# Patient Record
Sex: FEMALE | Race: WHITE | URBAN - METROPOLITAN AREA
[De-identification: names, ages, dates, MRNs, and addresses within clinical notes are randomized per-mention and may not be internally consistent; named-entity substitution may affect disease eponyms.]

---

## 2017-01-23 PROBLEM — M79.605 PAIN OF LEFT LOWER EXTREMITY: Status: ACTIVE | Noted: 2017-01-23

## 2017-04-05 PROBLEM — M17.0 PRIMARY OSTEOARTHRITIS OF BOTH KNEES: Status: ACTIVE | Noted: 2017-04-05

## 2021-06-10 DIAGNOSIS — M19.012 PRIMARY OSTEOARTHRITIS OF BOTH SHOULDERS: ICD-10-CM

## 2021-06-10 DIAGNOSIS — Z51.81 ENCOUNTER FOR MONITORING OPIOID MAINTENANCE THERAPY: ICD-10-CM

## 2021-06-10 DIAGNOSIS — F11.90 CHRONIC, CONTINUOUS USE OF OPIOIDS: ICD-10-CM

## 2021-06-10 DIAGNOSIS — M25.512 CHRONIC PAIN OF BOTH SHOULDERS: ICD-10-CM

## 2021-06-10 DIAGNOSIS — M47.817 LUMBOSACRAL SPONDYLOSIS WITHOUT MYELOPATHY: ICD-10-CM

## 2021-06-10 DIAGNOSIS — G89.29 CHRONIC PAIN OF BOTH SHOULDERS: ICD-10-CM

## 2021-06-10 DIAGNOSIS — Z79.891 ENCOUNTER FOR MONITORING OPIOID MAINTENANCE THERAPY: ICD-10-CM

## 2021-06-10 DIAGNOSIS — M17.0 PRIMARY OSTEOARTHRITIS OF BOTH KNEES: ICD-10-CM

## 2021-06-10 DIAGNOSIS — M19.011 PRIMARY OSTEOARTHRITIS OF BOTH SHOULDERS: ICD-10-CM

## 2021-06-10 DIAGNOSIS — M25.511 CHRONIC PAIN OF BOTH SHOULDERS: ICD-10-CM

## 2021-06-10 NOTE — TELEPHONE ENCOUNTER
Requested Prescriptions     Pending Prescriptions Disp Refills    HYDROcodone-acetaminophen (NORCO) 7.5-325 MG per tablet 39 tablet 0     Sig: Take 1 tablet by mouth every 8 hours as needed for Pain for up to 13 days.  gabapentin (NEURONTIN) 600 MG tablet 48 tablet 0     Sig: Take 1 tablet by mouth 3 times daily for 16 days.  gabapentin (NEURONTIN) 300 MG capsule 48 capsule 0     Sig: Take 1 capsule by mouth 3 times daily for 16 days.  600mg plus 300mg for total 900mg TID       PATIENT WAS RESCHEDULED PER DR OLMOS TO [place]

## 2021-06-11 RX ORDER — GABAPENTIN 600 MG/1
600 TABLET ORAL 3 TIMES DAILY
Qty: 15 TABLET | Refills: 0 | Status: SHIPPED | OUTPATIENT
Start: 2021-06-11 | End: 2021-06-15 | Stop reason: SDUPTHER

## 2021-06-11 RX ORDER — HYDROCODONE BITARTRATE AND ACETAMINOPHEN 7.5; 325 MG/1; MG/1
1 TABLET ORAL EVERY 8 HOURS PRN
Qty: 15 TABLET | Refills: 0 | Status: SHIPPED | OUTPATIENT
Start: 2021-06-11 | End: 2021-06-15 | Stop reason: SDUPTHER

## 2021-06-11 RX ORDER — GABAPENTIN 300 MG/1
300 CAPSULE ORAL 3 TIMES DAILY
Qty: 15 CAPSULE | Refills: 0 | Status: SHIPPED | OUTPATIENT
Start: 2021-06-11 | End: 2021-06-15 | Stop reason: SDUPTHER

## 2021-06-15 ENCOUNTER — VIRTUAL VISIT (OUTPATIENT)
Dept: NEUROSURGERY | Age: 81
End: 2021-06-15
Payer: MEDICARE

## 2021-06-15 DIAGNOSIS — Z51.81 ENCOUNTER FOR MONITORING OPIOID MAINTENANCE THERAPY: ICD-10-CM

## 2021-06-15 DIAGNOSIS — M25.511 CHRONIC PAIN OF BOTH SHOULDERS: ICD-10-CM

## 2021-06-15 DIAGNOSIS — M25.512 CHRONIC PAIN OF BOTH SHOULDERS: ICD-10-CM

## 2021-06-15 DIAGNOSIS — M19.012 PRIMARY OSTEOARTHRITIS OF BOTH SHOULDERS: ICD-10-CM

## 2021-06-15 DIAGNOSIS — Z79.891 ENCOUNTER FOR MONITORING OPIOID MAINTENANCE THERAPY: ICD-10-CM

## 2021-06-15 DIAGNOSIS — F11.90 CHRONIC, CONTINUOUS USE OF OPIOIDS: ICD-10-CM

## 2021-06-15 DIAGNOSIS — G89.29 CHRONIC PAIN OF BOTH SHOULDERS: ICD-10-CM

## 2021-06-15 DIAGNOSIS — M17.0 PRIMARY OSTEOARTHRITIS OF BOTH KNEES: ICD-10-CM

## 2021-06-15 DIAGNOSIS — M47.817 LUMBOSACRAL SPONDYLOSIS WITHOUT MYELOPATHY: ICD-10-CM

## 2021-06-15 DIAGNOSIS — M19.011 PRIMARY OSTEOARTHRITIS OF BOTH SHOULDERS: ICD-10-CM

## 2021-06-15 PROCEDURE — 99441 PR PHYS/QHP TELEPHONE EVALUATION 5-10 MIN: CPT | Performed by: NURSE PRACTITIONER

## 2021-06-15 RX ORDER — HYDROCODONE BITARTRATE AND ACETAMINOPHEN 7.5; 325 MG/1; MG/1
1 TABLET ORAL EVERY 8 HOURS PRN
Qty: 90 TABLET | Refills: 0 | Status: SHIPPED | OUTPATIENT
Start: 2021-06-15 | End: 2021-07-13 | Stop reason: SDUPTHER

## 2021-06-15 RX ORDER — GABAPENTIN 600 MG/1
600 TABLET ORAL 3 TIMES DAILY
Qty: 90 TABLET | Refills: 0 | Status: SHIPPED | OUTPATIENT
Start: 2021-06-15 | End: 2021-07-13 | Stop reason: SDUPTHER

## 2021-06-15 RX ORDER — GABAPENTIN 300 MG/1
300 CAPSULE ORAL 3 TIMES DAILY
Qty: 90 CAPSULE | Refills: 0 | Status: SHIPPED | OUTPATIENT
Start: 2021-06-15 | End: 2021-07-13 | Stop reason: SDUPTHER

## 2021-06-15 ASSESSMENT — ENCOUNTER SYMPTOMS
RESPIRATORY NEGATIVE: 1
ABDOMINAL PAIN: 0
BACK PAIN: 1
COLOR CHANGE: 0

## 2021-06-15 NOTE — PROGRESS NOTES
Bianca Diez  (1940)    6/15/2021    TELEHEALTH EVALUATION -- Audio Only (During [facility] public health emergency)    Due to [place] 19 outbreak, patient's office visit was converted to a virtual visit. Patient was contacted and agreed to proceed with a audio only telehealth service. Patient understands that this encounter is a billable visit and that insurance coverage and co-pays are up to their individual insurance plans. At the beginning of this telehealth encounter, I verified with the patient their name and date of birth. Verbal consent for telehealth encounter was obtained. Subjective:       Chief Complaint   Patient presents with    Knee Pain       Bianca Diez is 68 y.o. female who complains today of: Allergies:  Tetanus antitoxin    History:  Past Medical History:   Diagnosis Date    Hypertension     Measles     Mumps      No past surgical history on file. Family History   Problem Relation Age of Onset    Cancer Mother     Cancer Father      Social History     Socioeconomic History    Marital status: Unknown     Spouse name: Not on file    Number of children: Not on file    Years of education: Not on file    Highest education level: Not on file   Occupational History    Not on file   Tobacco Use    Smoking status: Never Smoker    Smokeless tobacco: Never Used   Substance and Sexual Activity    Alcohol use: No     Alcohol/week: 0.0 standard drinks    Drug use: No    Sexual activity: Not on file   Other Topics Concern    Not on file   Social History Narrative    Not on file     Social Determinants of Health     Financial Resource Strain:     Difficulty of Paying Living Expenses:    Food Insecurity:     Worried About Running Out of Food in the Last Year:     [address] in the Last Year:    Transportation Needs:     Lack of Transportation (Medical):      Lack of Transportation (Non-Medical):    Physical Activity:     Days of Exercise per Week:     Minutes of f/u done today with a telehealth visit for her pain d/t Covid 19 pandemic. Follows for  chronic right shoulder, knee pain and low back pain. Pain 7-8/10 to knees. Shoulder and low back are OK. XR of shoulder, low back and knees ordered not done. She received her first Covid vaccine and due for her second next week then hopes she will feel safer going out. Home PT for knees and shoulders completed. Says she is walking more but can't get out because of a step she can not take to get into the car. She refuses further injections for knees and shoulders. PCP switched  Effexor to Cymbalta 30mg about 3 months ago. Pt has been counseling and they may increase to change due to worsening depression. She denies suicidal. . She says she needs to lose weight and will consider knee replacement and is working on losing 50 pounds to be able to have surgery at [place] - she says this process is going slow for her. She saw nutritionist she reports which has helped  She says it is hard to walk d/t her knee pain and exercise. Tries to walk and exercise. She says her daughters will walk behind her with the w/c.        Hx of gastric bypass. Mobic discontinued due to age risk and gastric bypass. Gill Flores in EPIC show significant degenerative changes of Rt knee and hip as well as low back and lesser extent on Lt knee. She has Hx of blood clot in her left leg she reports in the past.   She denies blood thinners. She was just started on cymbalta 30mg daily from her PCP. She continues to use her Norco 7.5mg TID and gabapentin 300mg and 600mg - takes 900mg TID. Pt feels that standing, exercise, walking makes the pain worse, and medication, heating pad,  Staying off feet makes the pain better. Pt feels her medication helps her function and improve her quality of life, specifically says allows the pain to be \"bearable\" when get up to exercise and move. Pt denies radiating numbness and tingling.   Denies recent falls, injuries or trauma. Pt denies new weakness. Pt reports PT has been doing at home.           Knee Pain   Pertinent negatives include no numbness. Review of Systems   Constitutional: Negative. Negative for activity change and unexpected weight change. HENT: Negative. Respiratory: Negative. Gastrointestinal: Negative for abdominal pain. Genitourinary: Negative. Musculoskeletal: Positive for back pain. Negative for gait problem, joint swelling, neck pain and neck stiffness. Knee   Skin: Negative for color change and rash. Neurological: Negative. Negative for dizziness, seizures, weakness, light-headedness, numbness and headaches. Psychiatric/Behavioral: Negative. Negative for sleep disturbance. Objective:     Vitals:  LMP  (LMP Unknown)      PHYSICAL EXAMINATION:    [x] Alert  [x] Oriented to person/place/time  [x] No apparent distress      [x] Mood and affect normal  [x] Recent and remote memory intact  [x] Judgement and insight normal     [] OTHER:      Due to this being a TeleHealth encounter, evaluation of the following organ systems is limited: Vitals/Constitutional/EENT/Resp/CV/GI//MS/Neuro/Skin/Heme-Lymph-Imm. Assessment:      Diagnosis Orders   1. Lumbosacral spondylosis without myelopathy  HYDROcodone-acetaminophen (NORCO) 7.5-325 MG per tablet    gabapentin (NEURONTIN) 600 MG tablet    gabapentin (NEURONTIN) 300 MG capsule   2. Primary osteoarthritis of both knees  HYDROcodone-acetaminophen (NORCO) 7.5-325 MG per tablet   3. Primary osteoarthritis of both shoulders  HYDROcodone-acetaminophen (NORCO) 7.5-325 MG per tablet   4. Chronic, continuous use of opioids  HYDROcodone-acetaminophen (NORCO) 7.5-325 MG per tablet   5. Encounter for monitoring opioid maintenance therapy  HYDROcodone-acetaminophen (NORCO) 7.5-325 MG per tablet   6.  Chronic pain of both shoulders  HYDROcodone-acetaminophen (NORCO) 7.5-325 MG per tablet       Plan:     Periodic Controlled Substance Monitoring: Possible medication side effects, risk of tolerance/dependence & alternative treatments discussed. Deejay Gao, APRN - CNP)    Orders Placed This Encounter   Medications    HYDROcodone-acetaminophen (NORCO) 7.5-325 MG per tablet     Sig: Take 1 tablet by mouth every 8 hours as needed for Pain for up to 30 days. Dispense:  90 tablet     Refill:  0     Reduce doses taken as pain becomes manageable    gabapentin (NEURONTIN) 600 MG tablet     Sig: Take 1 tablet by mouth 3 times daily for 30 days. Dispense:  90 tablet     Refill:  0    gabapentin (NEURONTIN) 300 MG capsule     Sig: Take 1 capsule by mouth 3 times daily for 30 days. 600mg plus 300mg for total 900mg TID     Dispense:  90 capsule     Refill:  0       No orders of the defined types were placed in this encounter.    -she is unable to get into office due to her pain. She is going to try in the next couple of months.    -A 5-day refill was sent however she states she never picked it up at the pharmacy. She took her last pill today  -Refill Norco 7.5 mg 3 times daily, #90, fill today  -Refill gabapentin 600 mg 3 times daily, #90, fill today  -Refill gabapentin 300 mg 3 times daily, #90, fill today    No orders of the defined types were placed in this encounter.     Discussed options with the patient today. Anatomic model pathology was shown and reviewed with pt. All questions were answered. Relevant imaging reviewed, NDP reviewed and pain generators reviewed. Pt verbalized understanding and agrees with above plan. Will continue medications as they do help pt function with ADL and improve quality of life. Pt understands potential side effects from opioids such as constipation, dry mouth, dizziness, opioid use disorder, and overdose. Pt has failed non opioid therapy and decision was made to prescribe opioids to help with daily function and improve quality of life.     OARRS was reviewed.    UTOX from 9/23/2019 appropriate; ORT score = 2     This NP saw pt under direct supervision of Dr Teodora Abrams    Follow up:  Return in about 4 weeks (around 7/13/2021) for review meds and reassess pain. ESTEFANY Santos CNP      >50% of 7 minute appointment was spent with discussion, addressing questions and concerns, including prognosis, treatment options, patient education, and recommendations. [id number]    Pursuant to the emergency declaration under the [address] [place] [address], [facility] waiver authority and the [employer] Act, this Virtual  Visit was conducted, with patient's consent, to reduce the patient's risk of exposure to COVID-19 and provide continuity of care for an established patient. Services were provided through an audio discussion to substitute for in-person clinic visit.

## 2021-06-16 ENCOUNTER — TELEPHONE (OUTPATIENT)
Dept: PAIN MANAGEMENT | Age: 81
End: 2021-06-16

## 2021-06-16 NOTE — TELEPHONE ENCOUNTER
Patient called stating that she bought a lift chair. She is asking if Dr. Kimberly Wong can write a prescription for her for this so that she does not have to pay the tax on it. She is asking if it can be faxed ti [phone]. [employer] as soon as possible.

## 2021-07-13 ENCOUNTER — VIRTUAL VISIT (OUTPATIENT)
Dept: NEUROSURGERY | Age: 81
End: 2021-07-13
Payer: MEDICARE

## 2021-07-13 DIAGNOSIS — M19.011 PRIMARY OSTEOARTHRITIS OF BOTH SHOULDERS: ICD-10-CM

## 2021-07-13 DIAGNOSIS — Z79.891 ENCOUNTER FOR MONITORING OPIOID MAINTENANCE THERAPY: ICD-10-CM

## 2021-07-13 DIAGNOSIS — G89.29 CHRONIC PAIN OF BOTH SHOULDERS: ICD-10-CM

## 2021-07-13 DIAGNOSIS — Z51.81 ENCOUNTER FOR MONITORING OPIOID MAINTENANCE THERAPY: ICD-10-CM

## 2021-07-13 DIAGNOSIS — F11.90 CHRONIC, CONTINUOUS USE OF OPIOIDS: ICD-10-CM

## 2021-07-13 DIAGNOSIS — M17.0 PRIMARY OSTEOARTHRITIS OF BOTH KNEES: ICD-10-CM

## 2021-07-13 DIAGNOSIS — M19.012 PRIMARY OSTEOARTHRITIS OF BOTH SHOULDERS: ICD-10-CM

## 2021-07-13 DIAGNOSIS — M47.817 LUMBOSACRAL SPONDYLOSIS WITHOUT MYELOPATHY: ICD-10-CM

## 2021-07-13 DIAGNOSIS — M25.511 CHRONIC PAIN OF BOTH SHOULDERS: ICD-10-CM

## 2021-07-13 DIAGNOSIS — M25.512 CHRONIC PAIN OF BOTH SHOULDERS: ICD-10-CM

## 2021-07-13 PROCEDURE — 99441 PR PHYS/QHP TELEPHONE EVALUATION 5-10 MIN: CPT | Performed by: NURSE PRACTITIONER

## 2021-07-13 RX ORDER — HYDROCODONE BITARTRATE AND ACETAMINOPHEN 7.5; 325 MG/1; MG/1
1 TABLET ORAL EVERY 8 HOURS PRN
Qty: 90 TABLET | Refills: 0 | Status: SHIPPED | OUTPATIENT
Start: 2021-07-19 | End: 2021-08-10 | Stop reason: SDUPTHER

## 2021-07-13 RX ORDER — GABAPENTIN 600 MG/1
600 TABLET ORAL 3 TIMES DAILY
Qty: 90 TABLET | Refills: 0 | Status: SHIPPED | OUTPATIENT
Start: 2021-07-13 | End: 2021-08-10 | Stop reason: SDUPTHER

## 2021-07-13 RX ORDER — GABAPENTIN 300 MG/1
300 CAPSULE ORAL 3 TIMES DAILY
Qty: 90 CAPSULE | Refills: 0 | Status: SHIPPED | OUTPATIENT
Start: 2021-07-13 | End: 2021-08-10 | Stop reason: SDUPTHER

## 2021-07-13 ASSESSMENT — ENCOUNTER SYMPTOMS
BACK PAIN: 1
COLOR CHANGE: 0
ABDOMINAL PAIN: 0
RESPIRATORY NEGATIVE: 1

## 2021-07-13 NOTE — PROGRESS NOTES
Minutes of Exercise per Session:    Stress:     Feeling of Stress :    Social Connections:     Frequency of Communication with Friends and Family:     Frequency of Social Gatherings with Friends and Family:     Attends [employer]ism Services:     Active Member of Clubs or Organizations:     Attends Club or Organization Meetings:     Marital Status:    Intimate Partner Violence:     Fear of Current or Ex-Partner:     Emotionally Abused:     Physically Abused:     Sexually Abused:        Current Outpatient Medications on File Prior to Visit   Medication Sig Dispense Refill    naloxone 4 MG/0.1ML LIQD nasal spray 1 spray by Nasal route as needed for Opioid Reversal 1 each 0    lidocaine (LMX) 4 % cream Apply a half dollar sized amount to intact skin topically up to twice daily as needed for pain 1 Tube 1    meloxicam (MOBIC) 7.5 MG tablet Take 1 tablet by mouth daily 90 tablet 3    PRISTIQ 50 MG TB24 extended release tablet       MYRBETRIQ 25 MG TB24       pravastatin (PRAVACHOL) 10 MG tablet TK 1 T PO  QHS  1    escitalopram (LEXAPRO) 10 MG tablet   0    desvenlafaxine succinate (PRISTIQ) 100 MG TB24 Take 100 mg by mouth      enalapril (VASOTEC) 10 MG tablet Take by mouth      enalapril-hydrochlorothiazide (VASERETIC) 10-25 MG per tablet   0    oxybutynin (DITROPAN) 5 MG tablet   0     Current Facility-Administered Medications on File Prior to Visit   Medication Dose Route Frequency Provider Last Rate Last Admin    betamethasone acetate-betamethasone sodium phosphate (CELESTONE) injection 6 mg  6 mg Intra-articular Once Krissy Jarvis MD        betamethasone acetate-betamethasone sodium phosphate (CELESTONE) injection 6 mg  6 mg Intra-articular Once Krissy MD Matheus        betamethasone acetate-betamethasone sodium phosphate (CELESTONE) injection 6 mg  6 mg Intra-articular Once Krissy Jarvis MD           She  tried physical therapy.    Date of lastof last PT treatment:     Pt's f/u done today with a telehealth visit for her pain d/t Covid 19 pandemic. Follows for  chronic right shoulder, knee pain and low back pain. Pain 8-9/10 to knees. Shoulder and low back are OK. XR of shoulder, low back and knees ordered not done. Home PT for knees and shoulders completed. Says she is walking more but can't get out because of a step she can not take to get into the car. She refuses further injections for knees and shoulders. PCP switched  Effexor to Cymbalta 30mg. Pt has been counseling and they may increase to change due to worsening depression. She denies suicidal. . She says she needs to lose weight and will consider knee replacement and is working on losing 50 pounds to be able to have surgery at [facility] - she says this process is going slow for her. She saw nutritionist she reports which has helped  She says it is hard to walk d/t her knee pain and exercise. Tries to walk and exercise. She says her daughters will walk behind her with the w/c.        Hx of gastric bypass. Mobic discontinued due to age risk and gastric bypass. XR's in EPIC show significant degenerative changes of Rt knee and hip as well as low back and lesser extent on Lt knee. She has Hx of blood clot in her left leg she reports in the past.   She denies blood thinners. She was just started on cymbalta 30mg daily from her PCP. She continues to use her Norco 7.5mg TID and gabapentin 300mg and 600mg - takes 900mg TID. Pt feels that standing, exercise, walking makes the pain worse, and medication, heating pad,  Staying off feet makes the pain better. Pt feels her medication helps her function and improve her quality of life, specifically says allows the pain to be \"bearable\" when get up to exercise and move. Pt denies radiating numbness and tingling. Denies recent falls, injuries or trauma. Pt denies new weakness. Pt reports PT has been doing at home.              Knee Pain   Pertinent negatives include no numbness. Review of Systems   Constitutional: Negative. Negative for activity change and unexpected weight change. HENT: Negative. Respiratory: Negative. Gastrointestinal: Negative for abdominal pain. Genitourinary: Negative. Musculoskeletal: Positive for back pain. Negative for gait problem, joint swelling, neck pain and neck stiffness. Knees   Skin: Negative for color change and rash. Neurological: Negative. Negative for dizziness, seizures, weakness, light-headedness, numbness and headaches. Psychiatric/Behavioral: Negative. Negative for sleep disturbance. Objective:     Vitals:  LMP  (LMP Unknown)      PHYSICAL EXAMINATION:    [x] Alert  [x] Oriented to person/place/time  [x] No apparent distress      [x] Mood and affect normal  [x] Recent and remote memory intact  [x] Judgement and insight normal     [] OTHER:      Due to this being a TeleHealth encounter, evaluation of the following organ systems is limited: Vitals/Constitutional/EENT/Resp/CV/GI//MS/Neuro/Skin/Heme-Lymph-Imm. Assessment:      Diagnosis Orders   1. Lumbosacral spondylosis without myelopathy  HYDROcodone-acetaminophen (NORCO) 7.5-325 MG per tablet    gabapentin (NEURONTIN) 600 MG tablet    gabapentin (NEURONTIN) 300 MG capsule   2. Primary osteoarthritis of both knees  HYDROcodone-acetaminophen (NORCO) 7.5-325 MG per tablet   3. Primary osteoarthritis of both shoulders  HYDROcodone-acetaminophen (NORCO) 7.5-325 MG per tablet   4. Chronic, continuous use of opioids  HYDROcodone-acetaminophen (NORCO) 7.5-325 MG per tablet   5. Encounter for monitoring opioid maintenance therapy  HYDROcodone-acetaminophen (NORCO) 7.5-325 MG per tablet   6. Chronic pain of both shoulders  HYDROcodone-acetaminophen (NORCO) 7.5-325 MG per tablet       Plan:     Periodic Controlled Substance Monitoring: Possible medication side effects, risk of tolerance/dependence & alternative treatments discussed.  ESTEFANY Ortega - CNP)    Orders Placed This Encounter   Medications    HYDROcodone-acetaminophen (NORCO) 7.5-325 MG per tablet     Sig: Take 1 tablet by mouth every 8 hours as needed for Pain for up to 30 days. Dispense:  90 tablet     Refill:  0     Reduce doses taken as pain becomes manageable    gabapentin (NEURONTIN) 600 MG tablet     Sig: Take 1 tablet by mouth 3 times daily for 30 days. Dispense:  90 tablet     Refill:  0    gabapentin (NEURONTIN) 300 MG capsule     Sig: Take 1 capsule by mouth 3 times daily for 30 days. 600mg plus 300mg for total 900mg TID     Dispense:  90 capsule     Refill:  0       No orders of the defined types were placed in this encounter.    -she is unable to get into office due to her pain. She is going to try in the next couple of months.   -Refill Norco 7.5 mg 3 times daily, #90, fill date 7/19/21  -Refill gabapentin 600 mg 3 times daily, #90, fill date 7/19/21  -Refill gabapentin 300 mg 3 times daily, #90, fill date 7/19/21      No orders of the defined types were placed in this encounter.     Discussed options with the patient today. Anatomic model pathology was shown and reviewed with pt.  All questions were answered.  Relevant imaging reviewed, NDP reviewed and pain generators reviewed. Pt verbalized understanding and agrees with above plan. Will continue medications as they do help pt function with ADL and improve quality of life.  Pt understands potential side effects from opioids such as constipation, dry mouth, dizziness, opioid use disorder, and overdose. Pt has failed non opioid therapy and decision was made to prescribe opioids to help with daily function and improve quality of life.     OARRS was reviewed. UTOX from 9/23/2019 appropriate; ORT score = 2     This NP saw pt under direct supervision of Dr Mayer       Follow up:  Return in about 4 weeks (around 8/10/2021) for review meds and reassess pain.     ESTEFANY Molina - CNP      >50% of 10 minute appointment was spent with discussion, addressing questions and concerns, including prognosis, treatment options, patient education, and recommendations. [id number]    Pursuant to the emergency declaration under the [facility], [facility] waiver authority and the [employer] Act, this Virtual  Visit was conducted, with patient's consent, to reduce the patient's risk of exposure to COVID-19 and provide continuity of care for an established patient. Services were provided through an audio discussion to substitute for in-person clinic visit.

## 2021-08-10 ENCOUNTER — VIRTUAL VISIT (OUTPATIENT)
Dept: NEUROSURGERY | Age: 81
End: 2021-08-10
Payer: MEDICARE

## 2021-08-10 DIAGNOSIS — F11.90 CHRONIC, CONTINUOUS USE OF OPIOIDS: ICD-10-CM

## 2021-08-10 DIAGNOSIS — M47.817 LUMBOSACRAL SPONDYLOSIS WITHOUT MYELOPATHY: ICD-10-CM

## 2021-08-10 DIAGNOSIS — M17.0 PRIMARY OSTEOARTHRITIS OF BOTH KNEES: ICD-10-CM

## 2021-08-10 DIAGNOSIS — Z79.891 ENCOUNTER FOR MONITORING OPIOID MAINTENANCE THERAPY: ICD-10-CM

## 2021-08-10 DIAGNOSIS — M19.012 PRIMARY OSTEOARTHRITIS OF BOTH SHOULDERS: ICD-10-CM

## 2021-08-10 DIAGNOSIS — M19.011 PRIMARY OSTEOARTHRITIS OF BOTH SHOULDERS: ICD-10-CM

## 2021-08-10 DIAGNOSIS — M25.512 CHRONIC PAIN OF BOTH SHOULDERS: ICD-10-CM

## 2021-08-10 DIAGNOSIS — G89.29 CHRONIC PAIN OF BOTH SHOULDERS: ICD-10-CM

## 2021-08-10 DIAGNOSIS — Z51.81 ENCOUNTER FOR MONITORING OPIOID MAINTENANCE THERAPY: ICD-10-CM

## 2021-08-10 DIAGNOSIS — M25.511 CHRONIC PAIN OF BOTH SHOULDERS: ICD-10-CM

## 2021-08-10 PROCEDURE — 99441 PR PHYS/QHP TELEPHONE EVALUATION 5-10 MIN: CPT | Performed by: NURSE PRACTITIONER

## 2021-08-10 RX ORDER — HYDROCODONE BITARTRATE AND ACETAMINOPHEN 7.5; 325 MG/1; MG/1
1 TABLET ORAL EVERY 8 HOURS PRN
Qty: 90 TABLET | Refills: 0 | Status: SHIPPED | OUTPATIENT
Start: 2021-08-18 | End: 2021-09-17

## 2021-08-10 RX ORDER — GABAPENTIN 600 MG/1
600 TABLET ORAL 3 TIMES DAILY
Qty: 90 TABLET | Refills: 0 | Status: SHIPPED | OUTPATIENT
Start: 2021-08-10 | End: 2021-09-09

## 2021-08-10 RX ORDER — GABAPENTIN 300 MG/1
300 CAPSULE ORAL 3 TIMES DAILY
Qty: 90 CAPSULE | Refills: 0 | Status: SHIPPED | OUTPATIENT
Start: 2021-08-10 | End: 2021-09-09

## 2021-08-10 ASSESSMENT — ENCOUNTER SYMPTOMS
RESPIRATORY NEGATIVE: 1
COLOR CHANGE: 0
ABDOMINAL PAIN: 0
BACK PAIN: 1

## 2021-08-10 NOTE — PROGRESS NOTES
Ac Benitez  (1940)    8/10/2021    TELEHEALTH EVALUATION -- Audio Only (During BMAMD-84 public health emergency)    Due to [place] 19 outbreak, patient's office visit was converted to a virtual visit. Patient was contacted and agreed to proceed with a audio only telehealth service. Patient understands that this encounter is a billable visit and that insurance coverage and co-pays are up to their individual insurance plans. At the beginning of this telehealth encounter, I verified with the patient their name and date of birth. Verbal consent for telehealth encounter was obtained. Subjective:       Chief Complaint   Patient presents with    Knee Pain       Ac Benitez is 80 y.o. female who complains today of: Allergies:  Tetanus antitoxin    History:  Past Medical History:   Diagnosis Date    Hypertension     Measles     Mumps      No past surgical history on file. Family History   Problem Relation Age of Onset    Cancer Mother     Cancer Father      Social History     Socioeconomic History    Marital status: Unknown     Spouse name: Not on file    Number of children: Not on file    Years of education: Not on file    Highest education level: Not on file   Occupational History    Not on file   Tobacco Use    Smoking status: Never Smoker    Smokeless tobacco: Never Used   Substance and Sexual Activity    Alcohol use: No     Alcohol/week: 0.0 standard drinks    Drug use: No    Sexual activity: Not on file   Other Topics Concern    Not on file   Social History Narrative    Not on file     Social Determinants of Health     Financial Resource Strain:     Difficulty of Paying Living Expenses:    Food Insecurity:     Worried About Running Out of Food in the Last Year:     [address] in the Last Year:    Transportation Needs:     Lack of Transportation (Medical):      Lack of Transportation (Non-Medical):    Physical Activity:     Days of Exercise per Week:     Minutes of Exercise per Session:    Stress:     Feeling of Stress :    Social Connections:     Frequency of Communication with Friends and Family:     Frequency of Social Gatherings with Friends and Family:     Attends Evangelical Services:     Active Member of Clubs or Organizations:     Attends Club or Organization Meetings:     Marital Status:    Intimate Partner Violence:     Fear of Current or Ex-Partner:     Emotionally Abused:     Physically Abused:     Sexually Abused:        Current Outpatient Medications on File Prior to Visit   Medication Sig Dispense Refill    naloxone 4 MG/0.1ML LIQD nasal spray 1 spray by Nasal route as needed for Opioid Reversal 1 each 0    PRISTIQ 50 MG TB24 extended release tablet       MYRBETRIQ 25 MG TB24       pravastatin (PRAVACHOL) 10 MG tablet TK 1 T PO  QHS  1    escitalopram (LEXAPRO) 10 MG tablet   0    desvenlafaxine succinate (PRISTIQ) 100 MG TB24 Take 100 mg by mouth      enalapril (VASOTEC) 10 MG tablet Take by mouth      enalapril-hydrochlorothiazide (VASERETIC) 10-25 MG per tablet   0    oxybutynin (DITROPAN) 5 MG tablet   0    lidocaine (LMX) 4 % cream Apply a half dollar sized amount to intact skin topically up to twice daily as needed for pain 1 Tube 1    meloxicam (MOBIC) 7.5 MG tablet Take 1 tablet by mouth daily 90 tablet 3     Current Facility-Administered Medications on File Prior to Visit   Medication Dose Route Frequency Provider Last Rate Last Admin    betamethasone acetate-betamethasone sodium phosphate (CELESTONE) injection 6 mg  6 mg Intra-articular Once [place] MD Maria C        betamethasone acetate-betamethasone sodium phosphate (CELESTONE) injection 6 mg  6 mg Intra-articular Once [place] StageMD karen        betamethasone acetate-betamethasone sodium phosphate (CELESTONE) injection 6 mg  6 mg Intra-articular Once [place] MD Maria C           She  tried physical therapy.    Date of lastof last PT treatment:     Pt's f/u done today with a telehealth visit for her pain d/t Covid 19 pandemic. Follows for  chronic right shoulder, knee pain and low back pain. Pain 8-9/10 to knees. Shoulder and low back are OK. XR of shoulder, low back and knees ordered not done. Home PT for knees and shoulders completed. [address] would do her knee surgery if she loses 50#. Says she is walking more but can't get out because of a step she can not take to get into the car. She refuses further injections for knees and shoulders. PCP switched  Effexor to Cymbalta 30mg. Pt has been counseling and they may increase to change due to worsening depression. She denies suicidal. . She says she needs to lose weight and will consider knee replacement and is working on losing 50 pounds to be able to have surgery at [facility] - she says this process is going slow for her. She saw nutritionist she reports which has helped  She says it is hard to walk d/t her knee pain and exercise. Tries to walk and exercise. She says her daughters will walk behind her with the w/c.        Hx of gastric bypass. Mobic discontinued due to age risk and gastric bypass. XR's in EPIC show significant degenerative changes of Rt knee and hip as well as low back and lesser extent on Lt knee. She has Hx of blood clot in her left leg she reports in the past.   She denies blood thinners. She was just started on cymbalta 30mg daily from her PCP. She continues to use her Norco 7.5mg TID and gabapentin 300mg and 600mg - takes 900mg TID. Pt feels that standing, exercise, walking makes the pain worse, and medication, heating pad,  Staying off feet makes the pain better. Pt feels her medication helps her function and improve her quality of life, specifically says allows the pain to be \"bearable\" when get up to exercise and move. Pt denies radiating numbness and tingling. Denies recent falls, injuries or trauma. Pt denies new weakness.  Pt reports PT has been doing at home.                 Knee Pain   Pertinent negatives include no numbness. Review of Systems   Constitutional: Negative. Negative for activity change and unexpected weight change. HENT: Negative. Respiratory: Negative. Gastrointestinal: Negative for abdominal pain. Genitourinary: Negative. Musculoskeletal: Positive for back pain. Negative for gait problem, joint swelling, neck pain and neck stiffness. Skin: Negative for color change and rash. Neurological: Negative. Negative for dizziness, seizures, weakness, light-headedness, numbness and headaches. Psychiatric/Behavioral: Negative. Negative for sleep disturbance. Objective:     Vitals:  LMP  (LMP Unknown)      PHYSICAL EXAMINATION:    [x] Alert  [x] Oriented to person/place/time  [x] No apparent distress      [x] Mood and affect normal  [x] Recent and remote memory intact  [x] Judgement and insight normal     [] OTHER:      Due to this being a TeleHealth encounter, evaluation of the following organ systems is limited: Vitals/Constitutional/EENT/Resp/CV/GI//MS/Neuro/Skin/Heme-Lymph-Imm. Assessment:      Diagnosis Orders   1. Lumbosacral spondylosis without myelopathy  HYDROcodone-acetaminophen (NORCO) 7.5-325 MG per tablet    gabapentin (NEURONTIN) 600 MG tablet    gabapentin (NEURONTIN) 300 MG capsule   2. Primary osteoarthritis of both knees  HYDROcodone-acetaminophen (NORCO) 7.5-325 MG per tablet   3. Primary osteoarthritis of both shoulders  HYDROcodone-acetaminophen (NORCO) 7.5-325 MG per tablet   4. Chronic, continuous use of opioids  HYDROcodone-acetaminophen (NORCO) 7.5-325 MG per tablet   5. Encounter for monitoring opioid maintenance therapy  HYDROcodone-acetaminophen (NORCO) 7.5-325 MG per tablet   6.  Chronic pain of both shoulders  HYDROcodone-acetaminophen (NORCO) 7.5-325 MG per tablet       Plan:          Orders Placed This Encounter   Medications    HYDROcodone-acetaminophen (NORCO) 7.5-325 MG per tablet     Sig: Take 1 tablet by mouth every 8 hours as needed for Pain for up to 30 days. Dispense:  90 tablet     Refill:  0     Reduce doses taken as pain becomes manageable    gabapentin (NEURONTIN) 600 MG tablet     Sig: Take 1 tablet by mouth 3 times daily for 30 days. Dispense:  90 tablet     Refill:  0    gabapentin (NEURONTIN) 300 MG capsule     Sig: Take 1 capsule by mouth 3 times daily for 30 days. 600mg plus 300mg for total 900mg TID     Dispense:  90 capsule     Refill:  0       No orders of the defined types were placed in this encounter. she is unable to get into office due to her pain. She is going to try in the next couple of months.   -Refill Norco 7.5 mg 3 times daily, #90, fill date 8/18/21  -Refill gabapentin 600 mg 3 times daily, #90, fill date 8/20/21  -Refill gabapentin 300 mg 3 times daily, #90, fill date 8/20/21      No orders of the defined types were placed in this encounter.     Discussed options with the patient today. Anatomic model pathology was shown and reviewed with pt.  All questions were answered.  Relevant imaging reviewed, NDP reviewed and pain generators reviewed. Pt verbalized understanding and agrees with above plan. Will continue medications as they do help pt function with ADL and improve quality of life.  Pt understands potential side effects from opioids such as constipation, dry mouth, dizziness, opioid use disorder, and overdose. Pt has failed non opioid therapy and decision was made to prescribe opioids to help with daily function and improve quality of life.     OARRS was reviewed. UTOX from 9/23/2019 appropriate; ORT score = 2     This NP saw pt under direct supervision of Dr Mayer    Follow up:  Return in about 4 weeks (around 9/7/2021) for review meds and reassess pain.     Rosanne Nazario, APRN - CNP      >50% of 7 minute appointment was spent with discussion, addressing questions and concerns, including prognosis, treatment options, patient education, and recommendations. [id number]    Pursuant to the emergency declaration under the [facility], On license of [facility] waiver authority and the [employer] Act, this Virtual  Visit was conducted, with patient's consent, to reduce the patient's risk of exposure to COVID-19 and provide continuity of care for an established patient. Services were provided through an audio discussion to substitute for in-person clinic visit.

## 2021-09-07 ENCOUNTER — TELEPHONE (OUTPATIENT)
Dept: NEUROSURGERY | Age: 81
End: 2021-09-07

## 2021-09-07 NOTE — TELEPHONE ENCOUNTER
CALLED PT TO DO TELE HEALTH VISIT WITH LORA, PER PT PLEASE CANCEL ALL FUTURE APPTS. PT IS DISCHARGING HERSELF FROM OUR PRACTICE, HAS FOUND ANOTHER PROVIDER THAT WILL COME TO HER HOUSE.  PER PT \"IF IT DOESN'T WORK OUT SHE WILL GIVE US A CALL\"

## 2021-11-17 ENCOUNTER — VIRTUAL VISIT (OUTPATIENT)
Dept: PAIN MANAGEMENT | Age: 81
End: 2021-11-17
Payer: MEDICARE

## 2021-11-17 DIAGNOSIS — M17.0 PRIMARY OSTEOARTHRITIS OF BOTH KNEES: Primary | ICD-10-CM

## 2021-11-17 PROCEDURE — 99441 PR PHYS/QHP TELEPHONE EVALUATION 5-10 MIN: CPT | Performed by: NURSE PRACTITIONER

## 2021-11-17 RX ORDER — HYDROCODONE BITARTRATE AND ACETAMINOPHEN 7.5; 325 MG/1; MG/1
TABLET ORAL
COMMUNITY

## 2021-11-17 ASSESSMENT — ENCOUNTER SYMPTOMS
TROUBLE SWALLOWING: 0
EYES NEGATIVE: 1
BACK PAIN: 0
DIARRHEA: 0
COUGH: 0
SHORTNESS OF BREATH: 0
CONSTIPATION: 0
GASTROINTESTINAL NEGATIVE: 1

## 2021-11-17 NOTE — PROGRESS NOTES
Cezar Gardner  (1940)    11/17/2021    TELEHEALTH EVALUATION -- Audio/Visual (During YMWKG-86 public health emergency)    Due to COVID 19 outbreak, patient's office visit was converted to a virtual visit. Patient was contacted and agreed to proceed with a virtual visit via audio-visual platform. Patient understands that this encounter is a billable visit and that insurance coverage and co-pays are up to their individual insurance plans. At the beginning of this telehealth encounter, I verified with the patient their name and date of birth. Verbal consent for telehealth encounter was obtained. Subjective:       Chief Complaint   Patient presents with    Follow-up     Medication Renewal       Cezar Gardner is 80 y.o. female who complains today of: Allergies:  Tetanus antitoxin    History:  Past Medical History:   Diagnosis Date    Hypertension     Measles     Mumps      History reviewed. No pertinent surgical history. Family History   Problem Relation Age of Onset    Cancer Mother     Cancer Father      Social History     Socioeconomic History    Marital status: Unknown     Spouse name: Not on file    Number of children: Not on file    Years of education: Not on file    Highest education level: Not on file   Occupational History    Not on file   Tobacco Use    Smoking status: Never Smoker    Smokeless tobacco: Never Used   Substance and Sexual Activity    Alcohol use: No     Alcohol/week: 0.0 standard drinks    Drug use: No    Sexual activity: Not on file   Other Topics Concern    Not on file   Social History Narrative    Not on file     Social Determinants of Health     Financial Resource Strain:     Difficulty of Paying Living Expenses: Not on file   Food Insecurity:     Worried About Running Out of Food in the Last Year: Not on file    [place] of Food in the Last Year: Not on file   Transportation Needs:     Lack of Transportation (Medical):  Not on file    Lack of Transportation (Non-Medical): Not on file   Physical Activity:     Days of Exercise per Week: Not on file    Minutes of Exercise per Session: Not on file   Stress:     Feeling of Stress : Not on file   Social Connections:     Frequency of Communication with Friends and Family: Not on file    Frequency of Social Gatherings with Friends and Family: Not on file    Attends Hinduism Services: Not on file    Active Member of [address] [place] [address] or Organizations: Not on file    Attends Club or Organization Meetings: Not on file    Marital Status: Not on file   Intimate Partner Violence:     Fear of Current or Ex-Partner: Not on file    Emotionally Abused: Not on file    Physically Abused: Not on file    Sexually Abused: Not on file   Housing Stability:     Unable to Pay for Housing in the Last Year: Not on file    Number of [place] in the Last Year: Not on file    Unstable Housing in the Last Year: Not on file       Current Outpatient Medications on File Prior to Visit   Medication Sig Dispense Refill    HYDROcodone-acetaminophen (Malcolm Rdz) 7.5-325 MG per tablet       gabapentin (NEURONTIN) 600 MG tablet Take 1 tablet by mouth 3 times daily for 30 days. 90 tablet 0    gabapentin (NEURONTIN) 300 MG capsule Take 1 capsule by mouth 3 times daily for 30 days.  600mg plus 300mg for total 900mg TID 90 capsule 0    naloxone 4 MG/0.1ML LIQD nasal spray 1 spray by Nasal route as needed for Opioid Reversal 1 each 0    lidocaine (LMX) 4 % cream Apply a half dollar sized amount to intact skin topically up to twice daily as needed for pain 1 Tube 1    meloxicam (MOBIC) 7.5 MG tablet Take 1 tablet by mouth daily 90 tablet 3    PRISTIQ 50 MG TB24 extended release tablet       MYRBETRIQ 25 MG TB24       pravastatin (PRAVACHOL) 10 MG tablet TK 1 T PO  QHS  1    escitalopram (LEXAPRO) 10 MG tablet   0    desvenlafaxine succinate (PRISTIQ) 100 MG TB24 Take 100 mg by mouth      enalapril (VASOTEC) 10 MG tablet Take by mouth  enalapril-hydrochlorothiazide (VASERETIC) 10-25 MG per tablet   0    oxybutynin (DITROPAN) 5 MG tablet   0     Current Facility-Administered Medications on File Prior to Visit   Medication Dose Route Frequency Provider Last Rate Last Admin    betamethasone acetate-betamethasone sodium phosphate (CELESTONE) injection 6 mg  6 mg Intra-artICUlar Once  Service, MD        betamethasone acetate-betamethasone sodium phosphate (CELESTONE) injection 6 mg  6 mg Intra-artICUlar Once  Service, MD        betamethasone acetate-betamethasone sodium phosphate (CELESTONE) injection 6 mg  6 mg Intra-artICUlar Once  Service, MD             Pt's f/u done today with a telehealth visit for her pain d/t Covid 19 pandemic. Pt last saw Geovany Holley CNP in August 2021. She is in [place] [address] and says came to stay and says had UTI \"staph infection\" and says \"kidneys shut down\". Spoke with Grace Mejía nurse. She has been taking norco at [facility]. Nurse feels a lot of pain d/t her weight. She needs to be gopal lifted into chair. Doing PT. Nurse reports that she has not been very active. Recently had 90 day Rx for gabapentin 800mg in September from another provider. She has  chronic right shoulder, knee pain and low back pain. Hx of gastric bypass. Mobic discontinued due to age risk and gastric bypass in the past.  XR's in EPIC show significant degenerative changes of Rt knee and hip as well as low back and lesser extent on Lt knee. She has Hx of blood clot in her left leg she reports in the past.   She denies blood thinners. Review of Systems   Constitutional: Negative. Negative for fatigue. HENT: Negative. Negative for trouble swallowing. Eyes: Negative. Respiratory: Negative for cough and shortness of breath. Cardiovascular: Negative for chest pain. Gastrointestinal: Negative. Negative for constipation and diarrhea. Endocrine: Negative.     Genitourinary: Negative. Musculoskeletal: Positive for arthralgias. Negative for back pain and neck pain. Skin: Negative. Neurological: Negative for dizziness, weakness and headaches. Hematological: Negative. Psychiatric/Behavioral: Negative. Objective:     Vitals:  LMP  (LMP Unknown)      PHYSICAL EXAMINATION:    [x] Alert  [x] Oriented to person/place/time  [x] No apparent distress      [x] Mood and affect normal  [x] Recent and remote memory intact  [x] Judgement and insight normal     [] Breathing appears normal  [] No rash, lesions or ulcers on visible skin    [] Cranial Nerves II-XII grossly intact    [] Motor grossly intact in visible upper extremities    [] Motor grossly intact in visible lower extremities    [] Normal gait and station   [] No digital cyanosis    [] OTHER:      Due to this being a TeleHealth encounter, evaluation of the following organ systems is limited: Vitals/Constitutional/EENT/Resp/CV/GI//MS/Neuro/Skin/Heme-Lymph-Imm. Assessment:      Diagnosis Orders   1. Primary osteoarthritis of both knees         Plan:     Periodic Controlled Substance Monitoring: Possible medication side effects, risk of tolerance/dependence & alternative treatments discussed., No signs of potential drug abuse or diversion identified. , Assessed functional status., Obtaining appropriate analgesic effect of treatment. (Rima Varela, APRN - CNP)    No orders of the defined types were placed in this encounter. No orders of the defined types were placed in this encounter. Discussed options with the patient today. Telehealth visit d/t COVID-19 as noted above. Vies patient as well as patient's nurse that patient will need to be seen in the office for evaluation when she is discharged from her rehab facility. [facility] is managing her pain at this time. She has been doing physical therapy evidently and can continue. No medications will be sent today.  She has not received medications from this office since August of this year. Patient verbalized understanding and agreed to above plan and will call office to schedule an in person visit when she is discharged. Had a long conversation with patient that this patient needs to be seen in the office for the exam for physical exam as is been quite sometime since that was done. OARRS was reviewed. This NP saw pt under direct supervision of Dr. Ricky Reeves. Follow up:  Return if symptoms worsen or fail to improve. Rima Martinez, APRN - CNP      >50% of 10 minute appointment was spent with discussion, addressing questions and concerns, including prognosis, treatment options, patient education, and recommendations. [id number]    Pursuant to the emergency declaration under the [facility], 1135 waiver authority and the [employer] Act, this Virtual  Visit was conducted, with patient's consent, to reduce the patient's risk of exposure to COVID-19 and provide continuity of care for an established patient. Services were provided through a video synchronous discussion virtually to substitute for in-person clinic visit.

## 2023-08-17 LAB
APPEARANCE, URINE: ABNORMAL
BACTERIA, URINE: ABNORMAL /HPF
BILIRUBIN, URINE: NEGATIVE
BLOOD, URINE: ABNORMAL
COLOR, URINE: ABNORMAL
GLUCOSE, URINE: NEGATIVE MG/DL
HYALINE CASTS, URINE: ABNORMAL /LPF
KETONES, URINE: ABNORMAL MG/DL
LEUKOCYTE ESTERASE, URINE: ABNORMAL
NITRITE, URINE: NEGATIVE
PH, URINE: 5 (ref 5–8)
PROTEIN, URINE: ABNORMAL MG/DL
RBC, URINE: 3 /HPF (ref 0–5)
SPECIFIC GRAVITY, URINE: 1.02 (ref 1–1.03)
SQUAMOUS EPITHELIAL CELLS, URINE: 16 /HPF
UROBILINOGEN, URINE: 2 MG/DL (ref 0–1.9)
WBC, URINE: 58 /HPF (ref 0–5)

## 2023-08-20 LAB
URINE CULTURE: ABNORMAL
URINE CULTURE: ABNORMAL

## 2023-10-18 ENCOUNTER — HOME HEALTH ADMISSION (OUTPATIENT)
Dept: HOME HEALTH SERVICES | Facility: HOME HEALTH | Age: 83
End: 2023-10-18
Payer: MEDICARE

## 2023-10-20 ENCOUNTER — HOME CARE VISIT (OUTPATIENT)
Dept: HOME HEALTH SERVICES | Facility: HOME HEALTH | Age: 83
End: 2023-10-20
Payer: MEDICARE

## 2023-10-20 PROCEDURE — 169592 NO-PAY CLAIM PROCEDURE

## 2023-10-20 PROCEDURE — G0151 HHCP-SERV OF PT,EA 15 MIN: HCPCS | Mod: HHH

## 2023-10-20 PROCEDURE — 0023 HH SOC

## 2023-10-20 PROCEDURE — 1090000001 HH PPS REVENUE CREDIT

## 2023-10-20 PROCEDURE — 1090000002 HH PPS REVENUE DEBIT

## 2023-10-20 SDOH — ECONOMIC STABILITY: HOUSING INSECURITY: EVIDENCE OF SMOKING MATERIAL: 0

## 2023-10-20 SDOH — HEALTH STABILITY: MENTAL HEALTH: SMOKING IN HOME: 0

## 2023-10-20 ASSESSMENT — ENCOUNTER SYMPTOMS
PAIN LOCATION - PAIN SEVERITY: 6/10
PAIN LOCATION: RIGHT SHOULDER
PAIN LOCATION - PAIN SEVERITY: 6/10
LOWER EXTREMITY EDEMA: 1
SHORTNESS OF BREATH: T
PAIN LOCATION - PAIN SEVERITY: 6/10
DYSPNEA ON EXERTION: 1
PAIN LOCATION: LEFT KNEE
PAIN SEVERITY GOAL: 0/10
PERSON REPORTING PAIN: PATIENT
PAIN: 1
PAIN LOCATION: LEFT SHOULDER
PAIN LOCATION - PAIN SEVERITY: 6/10
PAIN LOCATION: RIGHT KNEE

## 2023-10-20 ASSESSMENT — ACTIVITIES OF DAILY LIVING (ADL): OASIS_M1830: 05

## 2023-10-21 PROCEDURE — 1090000002 HH PPS REVENUE DEBIT

## 2023-10-21 PROCEDURE — 1090000001 HH PPS REVENUE CREDIT

## 2023-10-22 ENCOUNTER — HOME CARE VISIT (OUTPATIENT)
Dept: HOME HEALTH SERVICES | Facility: HOME HEALTH | Age: 83
End: 2023-10-22
Payer: MEDICARE

## 2023-10-22 PROCEDURE — 1090000002 HH PPS REVENUE DEBIT

## 2023-10-22 PROCEDURE — 1090000001 HH PPS REVENUE CREDIT

## 2023-10-23 ENCOUNTER — APPOINTMENT (OUTPATIENT)
Dept: HOME HEALTH SERVICES | Facility: HOME HEALTH | Age: 83
End: 2023-10-23
Payer: MEDICARE

## 2023-10-23 PROCEDURE — 1090000001 HH PPS REVENUE CREDIT

## 2023-10-23 PROCEDURE — 1090000002 HH PPS REVENUE DEBIT

## 2023-10-24 ENCOUNTER — HOME CARE VISIT (OUTPATIENT)
Dept: HOME HEALTH SERVICES | Facility: HOME HEALTH | Age: 83
End: 2023-10-24
Payer: MEDICARE

## 2023-10-24 PROCEDURE — G0152 HHCP-SERV OF OT,EA 15 MIN: HCPCS | Mod: HHH

## 2023-10-24 PROCEDURE — 1090000001 HH PPS REVENUE CREDIT

## 2023-10-24 PROCEDURE — 1090000002 HH PPS REVENUE DEBIT

## 2023-10-24 SDOH — HEALTH STABILITY: MENTAL HEALTH: SMOKING IN HOME: 0

## 2023-10-24 SDOH — ECONOMIC STABILITY: HOUSING INSECURITY: EVIDENCE OF SMOKING MATERIAL: 0

## 2023-10-24 SDOH — ECONOMIC STABILITY: HOUSING INSECURITY: HOME SAFETY: WHEELCHAIR ACCESSIBLE SHOWER

## 2023-10-24 ASSESSMENT — ACTIVITIES OF DAILY LIVING (ADL)
PREPARING MEALS: DEPENDENT
LAUNDRY: DEPENDENT
BATHING ASSESSED: 1
LAUNDRY ASSESSED: 1
DRESSING_UB_CURRENT_FUNCTION: MAXIMUM ASSIST
HOUSEKEEPING ASSESSED: 1
TOILETING: 1
FEEDING EQUIPMENT USED: SETUP
AMBULATION ASSISTANCE: 1
FEEDING ASSESSED: 1
BATHING EQUIPMENT USED: BEDBATH
GROOMING ASSESSED: 1
BATHING_CURRENT_FUNCTION: MAXIMUM ASSIST
LIGHT HOUSEKEEPING: DEPENDENT

## 2023-10-24 ASSESSMENT — ENCOUNTER SYMPTOMS
HIGHEST PAIN SEVERITY IN PAST 24 HOURS: 7/10
LOWEST PAIN SEVERITY IN PAST 24 HOURS: 4/10
PAIN LOCATION - PAIN SEVERITY: 5/10
PERSON REPORTING PAIN: PATIENT
PAIN LOCATION: RIGHT SHOULDER
PAIN LOCATION - PAIN SEVERITY: 5/10
PAIN: 1
SUBJECTIVE PAIN PROGRESSION: UNCHANGED
PAIN LOCATION - PAIN SEVERITY: 5/10
PAIN LOCATION - PAIN SEVERITY: 5/10
PAIN LOCATION: LEFT KNEE
PAIN LOCATION: RIGHT KNEE
PAIN LOCATION: LEFT SHOULDER
PAIN SEVERITY GOAL: 2/10

## 2023-10-25 ENCOUNTER — HOME CARE VISIT (OUTPATIENT)
Dept: HOME HEALTH SERVICES | Facility: HOME HEALTH | Age: 83
End: 2023-10-25
Payer: MEDICARE

## 2023-10-25 VITALS
TEMPERATURE: 97.8 F | HEART RATE: 78 BPM | SYSTOLIC BLOOD PRESSURE: 126 MMHG | OXYGEN SATURATION: 98 % | RESPIRATION RATE: 18 BRPM | DIASTOLIC BLOOD PRESSURE: 80 MMHG

## 2023-10-25 PROCEDURE — 1090000002 HH PPS REVENUE DEBIT

## 2023-10-25 PROCEDURE — 1090000001 HH PPS REVENUE CREDIT

## 2023-10-25 PROCEDURE — G0157 HHC PT ASSISTANT EA 15: HCPCS | Mod: HHH

## 2023-10-25 ASSESSMENT — ENCOUNTER SYMPTOMS
PAIN LOCATION - PAIN SEVERITY: 4/10
PAIN SEVERITY GOAL: 0/10
LOWEST PAIN SEVERITY IN PAST 24 HOURS: 2/10
PAIN LOCATION: RIGHT LEG
HIGHEST PAIN SEVERITY IN PAST 24 HOURS: 6/10
SUBJECTIVE PAIN PROGRESSION: GRADUALLY IMPROVING
PERSON REPORTING PAIN: PATIENT
PAIN LOCATION - PAIN QUALITY: THROBBING
PAIN LOCATION - PAIN FREQUENCY: FREQUENT
PAIN: 1

## 2023-10-26 ENCOUNTER — HOME CARE VISIT (OUTPATIENT)
Dept: HOME HEALTH SERVICES | Facility: HOME HEALTH | Age: 83
End: 2023-10-26
Payer: MEDICARE

## 2023-10-26 PROCEDURE — 1090000002 HH PPS REVENUE DEBIT

## 2023-10-26 PROCEDURE — G0299 HHS/HOSPICE OF RN EA 15 MIN: HCPCS | Mod: HHH

## 2023-10-26 PROCEDURE — 1090000001 HH PPS REVENUE CREDIT

## 2023-10-27 ENCOUNTER — HOME CARE VISIT (OUTPATIENT)
Dept: HOME HEALTH SERVICES | Facility: HOME HEALTH | Age: 83
End: 2023-10-27
Payer: MEDICARE

## 2023-10-27 VITALS
HEART RATE: 67 BPM | HEIGHT: 63 IN | TEMPERATURE: 97.8 F | SYSTOLIC BLOOD PRESSURE: 122 MMHG | RESPIRATION RATE: 18 BRPM | BODY MASS INDEX: 59.11 KG/M2 | OXYGEN SATURATION: 99 % | DIASTOLIC BLOOD PRESSURE: 58 MMHG

## 2023-10-27 VITALS
DIASTOLIC BLOOD PRESSURE: 76 MMHG | TEMPERATURE: 97.6 F | RESPIRATION RATE: 18 BRPM | SYSTOLIC BLOOD PRESSURE: 126 MMHG | HEART RATE: 78 BPM | OXYGEN SATURATION: 98 %

## 2023-10-27 PROCEDURE — G0157 HHC PT ASSISTANT EA 15: HCPCS | Mod: HHH

## 2023-10-27 PROCEDURE — G0156 HHCP-SVS OF AIDE,EA 15 MIN: HCPCS | Mod: HHH

## 2023-10-27 PROCEDURE — 1090000002 HH PPS REVENUE DEBIT

## 2023-10-27 PROCEDURE — 1090000001 HH PPS REVENUE CREDIT

## 2023-10-27 ASSESSMENT — ENCOUNTER SYMPTOMS
PAIN LOCATION - PAIN QUALITY: THROBBING
PAIN LOCATION - PAIN FREQUENCY: CONSTANT
LOWEST PAIN SEVERITY IN PAST 24 HOURS: 5/10
SUBJECTIVE PAIN PROGRESSION: GRADUALLY IMPROVING
PAIN LOCATION - PAIN FREQUENCY: CONSTANT
PAIN LOCATION: RIGHT KNEE
PAIN LOCATION: LEFT LEG
PAIN LOCATION - PAIN SEVERITY: 5/10
PAIN LOCATION - PAIN SEVERITY: 3/10
PAIN LOCATION - RELIEVING FACTORS: MEDS AND REST
PAIN LOCATION - RELIEVING FACTORS: MEDS AND REST
HIGHEST PAIN SEVERITY IN PAST 24 HOURS: 4/10
PERSON REPORTING PAIN: PATIENT
PERSON REPORTING PAIN: PATIENT
PAIN LOCATION - PAIN SEVERITY: 3/10
PAIN SEVERITY GOAL: 2/10
PAIN LOCATION - EXACERBATING FACTORS: ACTIVITY
PAIN: 1
HIGHEST PAIN SEVERITY IN PAST 24 HOURS: 5/10
LOWEST PAIN SEVERITY IN PAST 24 HOURS: 1/10
PAIN LOCATION - PAIN SEVERITY: 5/10
PAIN LOCATION - PAIN QUALITY: THROBBING
PAIN: 1
PAIN LOCATION - EXACERBATING FACTORS: ACTIVITY
SUBJECTIVE PAIN PROGRESSION: UNCHANGED
PAIN LOCATION: LEFT KNEE
PAIN LOCATION: RIGHT LEG

## 2023-10-28 PROCEDURE — 1090000001 HH PPS REVENUE CREDIT

## 2023-10-28 PROCEDURE — 1090000002 HH PPS REVENUE DEBIT

## 2023-10-29 PROCEDURE — 1090000002 HH PPS REVENUE DEBIT

## 2023-10-29 PROCEDURE — 1090000001 HH PPS REVENUE CREDIT

## 2023-10-30 ENCOUNTER — APPOINTMENT (OUTPATIENT)
Dept: HOME HEALTH SERVICES | Facility: HOME HEALTH | Age: 83
End: 2023-10-30
Payer: MEDICARE

## 2023-10-30 PROCEDURE — 1090000002 HH PPS REVENUE DEBIT

## 2023-10-30 PROCEDURE — 1090000001 HH PPS REVENUE CREDIT

## 2023-10-31 ENCOUNTER — HOME CARE VISIT (OUTPATIENT)
Dept: HOME HEALTH SERVICES | Facility: HOME HEALTH | Age: 83
End: 2023-10-31
Payer: MEDICARE

## 2023-10-31 PROCEDURE — G0158 HHC OT ASSISTANT EA 15: HCPCS | Mod: HHH

## 2023-10-31 PROCEDURE — 1090000001 HH PPS REVENUE CREDIT

## 2023-10-31 PROCEDURE — 1090000002 HH PPS REVENUE DEBIT

## 2023-10-31 SDOH — HEALTH STABILITY: MENTAL HEALTH: SMOKING IN HOME: 0

## 2023-10-31 SDOH — ECONOMIC STABILITY: HOUSING INSECURITY: EVIDENCE OF SMOKING MATERIAL: 0

## 2023-10-31 ASSESSMENT — PAIN SCALES - PAIN ASSESSMENT IN ADVANCED DEMENTIA (PAINAD)
CONSOLABILITY: 0
NEGVOCALIZATION: 0 - NONE.
BREATHING: 0
FACIALEXPRESSION: 0 - SMILING OR INEXPRESSIVE.
TOTALSCORE: 0
BODYLANGUAGE: 0 - RELAXED.
BODYLANGUAGE: 0
NEGVOCALIZATION: 0
FACIALEXPRESSION: 0
CONSOLABILITY: 0 - NO NEED TO CONSOLE.

## 2023-10-31 ASSESSMENT — ENCOUNTER SYMPTOMS
OCCASIONAL FEELINGS OF UNSTEADINESS: 0
DENIES PAIN: 1
ANGER WITHIN DEFINED LIMITS: 1
AGGRESSION WITHIN DEFINED LIMITS: 1

## 2023-11-01 PROCEDURE — 1090000002 HH PPS REVENUE DEBIT

## 2023-11-01 PROCEDURE — 1090000001 HH PPS REVENUE CREDIT

## 2023-11-02 ENCOUNTER — HOME CARE VISIT (OUTPATIENT)
Dept: HOME HEALTH SERVICES | Facility: HOME HEALTH | Age: 83
End: 2023-11-02
Payer: MEDICARE

## 2023-11-02 VITALS
TEMPERATURE: 97.7 F | SYSTOLIC BLOOD PRESSURE: 118 MMHG | OXYGEN SATURATION: 99 % | HEART RATE: 61 BPM | RESPIRATION RATE: 18 BRPM | DIASTOLIC BLOOD PRESSURE: 68 MMHG

## 2023-11-02 PROCEDURE — G0299 HHS/HOSPICE OF RN EA 15 MIN: HCPCS | Mod: HHH

## 2023-11-02 PROCEDURE — 1090000002 HH PPS REVENUE DEBIT

## 2023-11-02 PROCEDURE — 1090000001 HH PPS REVENUE CREDIT

## 2023-11-02 ASSESSMENT — ENCOUNTER SYMPTOMS
STOOL FREQUENCY: DAILY
LOWER EXTREMITY EDEMA: 1
DIARRHEA: 1
MUSCLE WEAKNESS: 1
APPETITE LEVEL: GOOD
DENIES PAIN: 1

## 2023-11-03 ENCOUNTER — HOME CARE VISIT (OUTPATIENT)
Dept: HOME HEALTH SERVICES | Facility: HOME HEALTH | Age: 83
End: 2023-11-03
Payer: MEDICARE

## 2023-11-03 PROCEDURE — 1090000001 HH PPS REVENUE CREDIT

## 2023-11-03 PROCEDURE — 1090000002 HH PPS REVENUE DEBIT

## 2023-11-03 PROCEDURE — G0157 HHC PT ASSISTANT EA 15: HCPCS | Mod: HHH

## 2023-11-03 PROCEDURE — G0156 HHCP-SVS OF AIDE,EA 15 MIN: HCPCS | Mod: HHH

## 2023-11-03 PROCEDURE — G0158 HHC OT ASSISTANT EA 15: HCPCS | Mod: HHH

## 2023-11-03 SDOH — HEALTH STABILITY: MENTAL HEALTH: SMOKING IN HOME: 0

## 2023-11-03 SDOH — ECONOMIC STABILITY: HOUSING INSECURITY: EVIDENCE OF SMOKING MATERIAL: 0

## 2023-11-03 ASSESSMENT — PAIN SCALES - PAIN ASSESSMENT IN ADVANCED DEMENTIA (PAINAD)
NEGVOCALIZATION: 0
FACIALEXPRESSION: 0
BODYLANGUAGE: 0 - RELAXED.
CONSOLABILITY: 0
BREATHING: 1
TOTALSCORE: 1
CONSOLABILITY: 0 - NO NEED TO CONSOLE.
NEGVOCALIZATION: 0 - NONE.
FACIALEXPRESSION: 0 - SMILING OR INEXPRESSIVE.
BODYLANGUAGE: 0

## 2023-11-03 ASSESSMENT — ENCOUNTER SYMPTOMS
PAIN LOCATION: RIGHT KNEE
SUBJECTIVE PAIN PROGRESSION: WAXING AND WANING
ANGER WITHIN DEFINED LIMITS: 1
PAIN: 1
PAIN SEVERITY GOAL: 4/10
PAIN LOCATION: LEFT KNEE
LOWEST PAIN SEVERITY IN PAST 24 HOURS: 4/10
HIGHEST PAIN SEVERITY IN PAST 24 HOURS: 8/10
PERSON REPORTING PAIN: PATIENT
AGGRESSION WITHIN DEFINED LIMITS: 1

## 2023-11-03 ASSESSMENT — ACTIVITIES OF DAILY LIVING (ADL): FEEDING_WITHIN_DEFINED_LIMITS: 1

## 2023-11-04 ENCOUNTER — HOME CARE VISIT (OUTPATIENT)
Dept: HOME HEALTH SERVICES | Facility: HOME HEALTH | Age: 83
End: 2023-11-04
Payer: MEDICARE

## 2023-11-04 PROCEDURE — 1090000001 HH PPS REVENUE CREDIT

## 2023-11-04 PROCEDURE — 1090000002 HH PPS REVENUE DEBIT

## 2023-11-04 SDOH — ECONOMIC STABILITY: HOUSING INSECURITY: EVIDENCE OF SMOKING MATERIAL: 0

## 2023-11-04 SDOH — HEALTH STABILITY: MENTAL HEALTH: SMOKING IN HOME: 0

## 2023-11-04 SDOH — HEALTH STABILITY: PHYSICAL HEALTH: EXERCISE COMMENTS: VERBAL REVIEW OF SUPINE LE STRENGTHENING AND ROM HEP

## 2023-11-04 ASSESSMENT — ENCOUNTER SYMPTOMS
PAIN: 1
SUBJECTIVE PAIN PROGRESSION: UNCHANGED
MUSCLE WEAKNESS: 1
PAIN LOCATION: LEFT KNEE
PERSON REPORTING PAIN: PATIENT
LIMITED RANGE OF MOTION: 1
PAIN LOCATION: RIGHT KNEE
HIGHEST PAIN SEVERITY IN PAST 24 HOURS: 8/10
LOWEST PAIN SEVERITY IN PAST 24 HOURS: 4/10

## 2023-11-04 ASSESSMENT — ACTIVITIES OF DAILY LIVING (ADL)
PHYSICAL TRANSFERS ASSESSED: 1
PHYSICAL_TRANSFERS_DEVICES: HOYER LIFT
CURRENT_FUNCTION: TWO PERSON

## 2023-11-05 PROCEDURE — 1090000001 HH PPS REVENUE CREDIT

## 2023-11-05 PROCEDURE — 1090000002 HH PPS REVENUE DEBIT

## 2023-11-06 PROCEDURE — 1090000001 HH PPS REVENUE CREDIT

## 2023-11-06 PROCEDURE — 1090000002 HH PPS REVENUE DEBIT

## 2023-11-07 ENCOUNTER — HOME CARE VISIT (OUTPATIENT)
Dept: HOME HEALTH SERVICES | Facility: HOME HEALTH | Age: 83
End: 2023-11-07
Payer: MEDICARE

## 2023-11-07 PROCEDURE — G0157 HHC PT ASSISTANT EA 15: HCPCS | Mod: HHH

## 2023-11-07 PROCEDURE — 1090000001 HH PPS REVENUE CREDIT

## 2023-11-07 PROCEDURE — G0158 HHC OT ASSISTANT EA 15: HCPCS | Mod: HHH

## 2023-11-07 PROCEDURE — 1090000002 HH PPS REVENUE DEBIT

## 2023-11-07 PROCEDURE — G0156 HHCP-SVS OF AIDE,EA 15 MIN: HCPCS | Mod: HHH

## 2023-11-07 SDOH — HEALTH STABILITY: MENTAL HEALTH: SMOKING IN HOME: 0

## 2023-11-07 SDOH — ECONOMIC STABILITY: HOUSING INSECURITY: EVIDENCE OF SMOKING MATERIAL: 0

## 2023-11-07 ASSESSMENT — PAIN SCALES - PAIN ASSESSMENT IN ADVANCED DEMENTIA (PAINAD)
FACIALEXPRESSION: 0
BODYLANGUAGE: 0
BREATHING: 1
BODYLANGUAGE: 0 - RELAXED.
NEGVOCALIZATION: 0
CONSOLABILITY: 1
NEGVOCALIZATION: 0 - NONE.
CONSOLABILITY: 1 - DISTRACTED OR REASSURED BY VOICE OR TOUCH.
TOTALSCORE: 2
FACIALEXPRESSION: 0 - SMILING OR INEXPRESSIVE.

## 2023-11-07 ASSESSMENT — ENCOUNTER SYMPTOMS
PAIN: 1
PAIN LOCATION - EXACERBATING FACTORS: ACTIVITY
LOWEST PAIN SEVERITY IN PAST 24 HOURS: 4/10
PAIN LOCATION: LEFT KNEE
PAIN SEVERITY GOAL: 3/10
PERSON REPORTING PAIN: PATIENT
ANGER WITHIN DEFINED LIMITS: 1
PAIN LOCATION - EXACERBATING FACTORS: ACTIVITY
AGGRESSION WITHIN DEFINED LIMITS: 1
SUBJECTIVE PAIN PROGRESSION: WAXING AND WANING
HIGHEST PAIN SEVERITY IN PAST 24 HOURS: 7/10
PAIN LOCATION: RIGHT KNEE

## 2023-11-07 ASSESSMENT — PAIN DESCRIPTION - PAIN TYPE: TYPE: CHRONIC PAIN

## 2023-11-08 PROCEDURE — 1090000002 HH PPS REVENUE DEBIT

## 2023-11-08 PROCEDURE — 1090000001 HH PPS REVENUE CREDIT

## 2023-11-08 SDOH — ECONOMIC STABILITY: HOUSING INSECURITY: EVIDENCE OF SMOKING MATERIAL: 0

## 2023-11-08 SDOH — HEALTH STABILITY: MENTAL HEALTH: SMOKING IN HOME: 0

## 2023-11-08 ASSESSMENT — ACTIVITIES OF DAILY LIVING (ADL)
PHYSICAL TRANSFERS ASSESSED: 1
CURRENT_FUNCTION: TWO PERSON

## 2023-11-08 ASSESSMENT — ENCOUNTER SYMPTOMS
PAIN LOCATION: RIGHT KNEE
PAIN: 1
PAIN LOCATION - PAIN SEVERITY: 5/10
LIMITED RANGE OF MOTION: 1
LOWEST PAIN SEVERITY IN PAST 24 HOURS: 2/10
MUSCLE WEAKNESS: 1
HIGHEST PAIN SEVERITY IN PAST 24 HOURS: 7/10
SUBJECTIVE PAIN PROGRESSION: WAXING AND WANING

## 2023-11-09 ENCOUNTER — HOME CARE VISIT (OUTPATIENT)
Dept: HOME HEALTH SERVICES | Facility: HOME HEALTH | Age: 83
End: 2023-11-09
Payer: MEDICARE

## 2023-11-09 PROCEDURE — 1090000001 HH PPS REVENUE CREDIT

## 2023-11-09 PROCEDURE — 1090000002 HH PPS REVENUE DEBIT

## 2023-11-09 PROCEDURE — G0157 HHC PT ASSISTANT EA 15: HCPCS | Mod: HHH

## 2023-11-09 PROCEDURE — G0158 HHC OT ASSISTANT EA 15: HCPCS | Mod: HHH

## 2023-11-09 SDOH — HEALTH STABILITY: MENTAL HEALTH: SMOKING IN HOME: 0

## 2023-11-09 SDOH — ECONOMIC STABILITY: HOUSING INSECURITY: EVIDENCE OF SMOKING MATERIAL: 0

## 2023-11-09 ASSESSMENT — PAIN DESCRIPTION - PAIN TYPE: TYPE: CHRONIC PAIN

## 2023-11-09 ASSESSMENT — PAIN SCALES - PAIN ASSESSMENT IN ADVANCED DEMENTIA (PAINAD)
BODYLANGUAGE: 0
NEGVOCALIZATION: 0 - NONE.
BODYLANGUAGE: 0 - RELAXED.
NEGVOCALIZATION: 0
FACIALEXPRESSION: 0 - SMILING OR INEXPRESSIVE.
TOTALSCORE: 0
FACIALEXPRESSION: 0
CONSOLABILITY: 0
BREATHING: 0
CONSOLABILITY: 0 - NO NEED TO CONSOLE.

## 2023-11-09 ASSESSMENT — ENCOUNTER SYMPTOMS
HIGHEST PAIN SEVERITY IN PAST 24 HOURS: 6/10
SUBJECTIVE PAIN PROGRESSION: UNCHANGED
AGGRESSION WITHIN DEFINED LIMITS: 1
PERSON REPORTING PAIN: PATIENT
PAIN LOCATION: LEFT KNEE
PAIN LOCATION: RIGHT KNEE
PAIN SEVERITY GOAL: 1/10
PAIN: 1
ANGER WITHIN DEFINED LIMITS: 1
LOWEST PAIN SEVERITY IN PAST 24 HOURS: 5/10

## 2023-11-10 ENCOUNTER — HOME CARE VISIT (OUTPATIENT)
Dept: HOME HEALTH SERVICES | Facility: HOME HEALTH | Age: 83
End: 2023-11-10
Payer: MEDICARE

## 2023-11-10 PROCEDURE — G0156 HHCP-SVS OF AIDE,EA 15 MIN: HCPCS | Mod: HHH

## 2023-11-10 PROCEDURE — 1090000001 HH PPS REVENUE CREDIT

## 2023-11-10 PROCEDURE — 1090000002 HH PPS REVENUE DEBIT

## 2023-11-10 ASSESSMENT — ENCOUNTER SYMPTOMS
PERSON REPORTING PAIN: PATIENT
HIGHEST PAIN SEVERITY IN PAST 24 HOURS: 6/10
LOWEST PAIN SEVERITY IN PAST 24 HOURS: 5/10
PAIN: 1
SUBJECTIVE PAIN PROGRESSION: UNCHANGED
PAIN LOCATION: RIGHT KNEE
PAIN LOCATION: LEFT KNEE

## 2023-11-11 ENCOUNTER — HOME CARE VISIT (OUTPATIENT)
Dept: HOME HEALTH SERVICES | Facility: HOME HEALTH | Age: 83
End: 2023-11-11
Payer: MEDICARE

## 2023-11-11 VITALS
RESPIRATION RATE: 18 BRPM | HEART RATE: 61 BPM | SYSTOLIC BLOOD PRESSURE: 128 MMHG | TEMPERATURE: 98.2 F | DIASTOLIC BLOOD PRESSURE: 64 MMHG | OXYGEN SATURATION: 99 %

## 2023-11-11 PROCEDURE — 1090000002 HH PPS REVENUE DEBIT

## 2023-11-11 PROCEDURE — 1090000001 HH PPS REVENUE CREDIT

## 2023-11-11 PROCEDURE — G0299 HHS/HOSPICE OF RN EA 15 MIN: HCPCS | Mod: HHH

## 2023-11-11 ASSESSMENT — ENCOUNTER SYMPTOMS
CHANGE IN APPETITE: UNCHANGED
APPETITE LEVEL: GOOD
BOWEL INCONTINENCE: 1
LOWER EXTREMITY EDEMA: 1
DENIES PAIN: 1
MUSCLE WEAKNESS: 1

## 2023-11-12 PROCEDURE — 1090000002 HH PPS REVENUE DEBIT

## 2023-11-12 PROCEDURE — 1090000001 HH PPS REVENUE CREDIT

## 2023-11-13 ENCOUNTER — HOME CARE VISIT (OUTPATIENT)
Dept: HOME HEALTH SERVICES | Facility: HOME HEALTH | Age: 83
End: 2023-11-13
Payer: MEDICARE

## 2023-11-13 PROCEDURE — G0157 HHC PT ASSISTANT EA 15: HCPCS | Mod: HHH

## 2023-11-13 PROCEDURE — G0158 HHC OT ASSISTANT EA 15: HCPCS | Mod: HHH

## 2023-11-13 PROCEDURE — 1090000001 HH PPS REVENUE CREDIT

## 2023-11-13 PROCEDURE — 1090000002 HH PPS REVENUE DEBIT

## 2023-11-13 SDOH — ECONOMIC STABILITY: HOUSING INSECURITY: EVIDENCE OF SMOKING MATERIAL: 0

## 2023-11-13 SDOH — HEALTH STABILITY: MENTAL HEALTH: SMOKING IN HOME: 0

## 2023-11-13 ASSESSMENT — ENCOUNTER SYMPTOMS
SUBJECTIVE PAIN PROGRESSION: UNCHANGED
ANGER WITHIN DEFINED LIMITS: 1
HIGHEST PAIN SEVERITY IN PAST 24 HOURS: 8/10
SUBJECTIVE PAIN PROGRESSION: UNCHANGED
PERSON REPORTING PAIN: PATIENT
AGGRESSION WITHIN DEFINED LIMITS: 1
HIGHEST PAIN SEVERITY IN PAST 24 HOURS: 5/10
PAIN LOCATION: RIGHT KNEE
LIMITED RANGE OF MOTION: 1
PAIN LOCATION - PAIN SEVERITY: 8/10
LOWEST PAIN SEVERITY IN PAST 24 HOURS: 8/10
PAIN: 1
PAIN LOCATION - EXACERBATING FACTORS: WEIGHT BEARING
MUSCLE WEAKNESS: 1
PAIN: 1
PERSON REPORTING PAIN: PATIENT
PAIN LOCATION: LEFT KNEE
PAIN LOCATION - EXACERBATING FACTORS: WEIGHT BEARING
PAIN SEVERITY GOAL: 2/10
PAIN LOCATION: RIGHT KNEE
PAIN LOCATION - PAIN SEVERITY: 8/10

## 2023-11-13 ASSESSMENT — PAIN SCALES - PAIN ASSESSMENT IN ADVANCED DEMENTIA (PAINAD)
TOTALSCORE: 1
BODYLANGUAGE: 0
NEGVOCALIZATION: 0
FACIALEXPRESSION: 0 - SMILING OR INEXPRESSIVE.
CONSOLABILITY: 0
CONSOLABILITY: 0 - NO NEED TO CONSOLE.
BREATHING: 1
FACIALEXPRESSION: 0
BODYLANGUAGE: 0 - RELAXED.
NEGVOCALIZATION: 0 - NONE.

## 2023-11-13 ASSESSMENT — ACTIVITIES OF DAILY LIVING (ADL): CURRENT_FUNCTION: TWO PERSON

## 2023-11-14 ENCOUNTER — HOME CARE VISIT (OUTPATIENT)
Dept: HOME HEALTH SERVICES | Facility: HOME HEALTH | Age: 83
End: 2023-11-14
Payer: MEDICARE

## 2023-11-14 PROCEDURE — 1090000002 HH PPS REVENUE DEBIT

## 2023-11-14 PROCEDURE — G0156 HHCP-SVS OF AIDE,EA 15 MIN: HCPCS | Mod: HHH

## 2023-11-14 PROCEDURE — 1090000001 HH PPS REVENUE CREDIT

## 2023-11-14 PROCEDURE — G0151 HHCP-SERV OF PT,EA 15 MIN: HCPCS | Mod: HHH

## 2023-11-14 SDOH — HEALTH STABILITY: PHYSICAL HEALTH: PHYSICAL EXERCISE: SUPINE

## 2023-11-14 SDOH — HEALTH STABILITY: PHYSICAL HEALTH: EXERCISE ACTIVITY: GLUTE SETS

## 2023-11-14 SDOH — HEALTH STABILITY: MENTAL HEALTH: SMOKING IN HOME: 0

## 2023-11-14 SDOH — ECONOMIC STABILITY: HOUSING INSECURITY: EVIDENCE OF SMOKING MATERIAL: 0

## 2023-11-14 SDOH — HEALTH STABILITY: PHYSICAL HEALTH: EXERCISE ACTIVITY: QUAD SETS

## 2023-11-14 SDOH — HEALTH STABILITY: PHYSICAL HEALTH: EXERCISE ACTIVITY: HIP ABDUCTION

## 2023-11-14 SDOH — HEALTH STABILITY: PHYSICAL HEALTH: EXERCISE ACTIVITY: HIP ADDUCTION

## 2023-11-14 SDOH — HEALTH STABILITY: PHYSICAL HEALTH: EXERCISE ACTIVITY: HEEL SLIDES

## 2023-11-14 SDOH — HEALTH STABILITY: PHYSICAL HEALTH: EXERCISE ACTIVITY: ANKLE PUMPS

## 2023-11-14 ASSESSMENT — ENCOUNTER SYMPTOMS
LIMITED RANGE OF MOTION: 1
PAIN SEVERITY GOAL: 0/10
PAIN: 1
PAIN LOCATION: LEFT LEG
PAIN LOCATION: RIGHT LEG
PERSON REPORTING PAIN: PATIENT
PAIN LOCATION - PAIN SEVERITY: 6/10
SUBJECTIVE PAIN PROGRESSION: UNCHANGED
MUSCLE WEAKNESS: 1
PAIN LOCATION - PAIN SEVERITY: 6/10

## 2023-11-14 ASSESSMENT — ACTIVITIES OF DAILY LIVING (ADL)
CURRENT_FUNCTION: TWO PERSON
AMBULATION ASSISTANCE: NON-AMBULATORY

## 2023-11-14 NOTE — HOME HEALTH
Pt has participated in 6 routine visits with PTA since PT SOC, with focus on bed mobility training, hector lift training, caregiver education/training, HEP, transfer training, and sitting balance training at EOB. Pt has demonstrated improvements in strength, functional mobility skills, and overall OOB activity in response to PT interventions. Recommend continued PT at 1w1, 2w3, 1w1 effective 11/14/23. Pt/caregivers agreeable.

## 2023-11-15 ENCOUNTER — HOME CARE VISIT (OUTPATIENT)
Dept: HOME HEALTH SERVICES | Facility: HOME HEALTH | Age: 83
End: 2023-11-15
Payer: MEDICARE

## 2023-11-15 VITALS
DIASTOLIC BLOOD PRESSURE: 70 MMHG | HEART RATE: 62 BPM | TEMPERATURE: 97.8 F | OXYGEN SATURATION: 98 % | SYSTOLIC BLOOD PRESSURE: 112 MMHG | RESPIRATION RATE: 18 BRPM

## 2023-11-15 PROCEDURE — G0299 HHS/HOSPICE OF RN EA 15 MIN: HCPCS | Mod: HHH

## 2023-11-15 PROCEDURE — 1090000001 HH PPS REVENUE CREDIT

## 2023-11-15 PROCEDURE — 1090000002 HH PPS REVENUE DEBIT

## 2023-11-16 ENCOUNTER — HOME CARE VISIT (OUTPATIENT)
Dept: HOME HEALTH SERVICES | Facility: HOME HEALTH | Age: 83
End: 2023-11-16
Payer: MEDICARE

## 2023-11-16 PROCEDURE — 1090000001 HH PPS REVENUE CREDIT

## 2023-11-16 PROCEDURE — 1090000002 HH PPS REVENUE DEBIT

## 2023-11-16 PROCEDURE — G0158 HHC OT ASSISTANT EA 15: HCPCS | Mod: HHH

## 2023-11-16 SDOH — ECONOMIC STABILITY: HOUSING INSECURITY: EVIDENCE OF SMOKING MATERIAL: 0

## 2023-11-16 SDOH — HEALTH STABILITY: MENTAL HEALTH: SMOKING IN HOME: 0

## 2023-11-16 ASSESSMENT — PAIN SCALES - PAIN ASSESSMENT IN ADVANCED DEMENTIA (PAINAD)
BREATHING: 1
FACIALEXPRESSION: 0 - SMILING OR INEXPRESSIVE.
FACIALEXPRESSION: 0
BODYLANGUAGE: 0 - RELAXED.
NEGVOCALIZATION: 0 - NONE.
CONSOLABILITY: 0
NEGVOCALIZATION: 0
BODYLANGUAGE: 0
CONSOLABILITY: 0 - NO NEED TO CONSOLE.
TOTALSCORE: 1

## 2023-11-16 ASSESSMENT — ENCOUNTER SYMPTOMS
AGGRESSION WITHIN DEFINED LIMITS: 1
LOWER EXTREMITY EDEMA: 1
PAIN LOCATION - PAIN FREQUENCY: CONSTANT
APPETITE LEVEL: GOOD
ARTHRALGIAS: 1
MUSCLE WEAKNESS: 1
PAIN LOCATION: RIGHT KNEE
PAIN: 1
ANGER WITHIN DEFINED LIMITS: 1
PAIN LOCATION: LEFT KNEE
DENIES PAIN: 1
PAIN LOCATION - PAIN SEVERITY: 8/10
LOWEST PAIN SEVERITY IN PAST 24 HOURS: 7/10
PERSON REPORTING PAIN: PATIENT
SUBJECTIVE PAIN PROGRESSION: UNCHANGED
DIARRHEA: 1
HIGHEST PAIN SEVERITY IN PAST 24 HOURS: 8/10
PAIN LOCATION - PAIN SEVERITY: 7/10
PAIN SEVERITY GOAL: 3/10
CHANGE IN APPETITE: UNCHANGED
PAIN LOCATION - EXACERBATING FACTORS: ACTIVITY

## 2023-11-16 ASSESSMENT — PAIN DESCRIPTION - PAIN TYPE: TYPE: OTHER (COMMENT)

## 2023-11-17 ENCOUNTER — HOME CARE VISIT (OUTPATIENT)
Dept: HOME HEALTH SERVICES | Facility: HOME HEALTH | Age: 83
End: 2023-11-17
Payer: MEDICARE

## 2023-11-17 PROCEDURE — 1090000002 HH PPS REVENUE DEBIT

## 2023-11-17 PROCEDURE — 1090000001 HH PPS REVENUE CREDIT

## 2023-11-17 PROCEDURE — G0156 HHCP-SVS OF AIDE,EA 15 MIN: HCPCS | Mod: HHH

## 2023-11-18 PROCEDURE — 1090000003 HH PPS REVENUE ADJ

## 2023-11-18 PROCEDURE — 1090000001 HH PPS REVENUE CREDIT

## 2023-11-18 PROCEDURE — 1090000002 HH PPS REVENUE DEBIT

## 2023-11-19 PROCEDURE — 1090000001 HH PPS REVENUE CREDIT

## 2023-11-19 PROCEDURE — 1090000002 HH PPS REVENUE DEBIT

## 2023-11-20 ENCOUNTER — HOME CARE VISIT (OUTPATIENT)
Dept: HOME HEALTH SERVICES | Facility: HOME HEALTH | Age: 83
End: 2023-11-20
Payer: MEDICARE

## 2023-11-20 PROCEDURE — 1090000001 HH PPS REVENUE CREDIT

## 2023-11-20 PROCEDURE — G0157 HHC PT ASSISTANT EA 15: HCPCS | Mod: HHH

## 2023-11-20 PROCEDURE — 0023 HH SOC

## 2023-11-20 PROCEDURE — 1090000002 HH PPS REVENUE DEBIT

## 2023-11-20 SDOH — HEALTH STABILITY: PHYSICAL HEALTH
EXERCISE COMMENTS: ANTERIOR LEAN WITH PROPER BODY MECHANICS AND POSITIONING 2X5, PRESS UP X5, SIT STAND X2 FOR 15 SEC DURATIONS

## 2023-11-20 SDOH — HEALTH STABILITY: PHYSICAL HEALTH: EXERCISE ACTIVITY: ANTERIOR LEAN, PRESS UP, SIT TO STAND

## 2023-11-20 SDOH — HEALTH STABILITY: PHYSICAL HEALTH: EXERCISE TYPE: PRE-TRANSFER EXS, COMPONENTS OF TRANSFERS

## 2023-11-20 SDOH — HEALTH STABILITY: PHYSICAL HEALTH: PHYSICAL EXERCISE: SEATED

## 2023-11-20 ASSESSMENT — ACTIVITIES OF DAILY LIVING (ADL)
CURRENT_FUNCTION: TWO PERSON
PHYSICAL TRANSFERS ASSESSED: 1

## 2023-11-20 ASSESSMENT — ENCOUNTER SYMPTOMS
PAIN: 1
LIMITED RANGE OF MOTION: 1
SUBJECTIVE PAIN PROGRESSION: UNCHANGED
PERSON REPORTING PAIN: PATIENT
MUSCLE WEAKNESS: 1

## 2023-11-21 ENCOUNTER — HOME CARE VISIT (OUTPATIENT)
Dept: HOME HEALTH SERVICES | Facility: HOME HEALTH | Age: 83
End: 2023-11-21
Payer: MEDICARE

## 2023-11-21 VITALS
SYSTOLIC BLOOD PRESSURE: 138 MMHG | OXYGEN SATURATION: 97 % | RESPIRATION RATE: 18 BRPM | TEMPERATURE: 97.1 F | HEART RATE: 60 BPM | DIASTOLIC BLOOD PRESSURE: 62 MMHG

## 2023-11-21 PROCEDURE — 1090000002 HH PPS REVENUE DEBIT

## 2023-11-21 PROCEDURE — G0152 HHCP-SERV OF OT,EA 15 MIN: HCPCS | Mod: HHH

## 2023-11-21 PROCEDURE — G0299 HHS/HOSPICE OF RN EA 15 MIN: HCPCS | Mod: HHH

## 2023-11-21 PROCEDURE — 1090000001 HH PPS REVENUE CREDIT

## 2023-11-21 ASSESSMENT — ENCOUNTER SYMPTOMS
CHANGE IN APPETITE: UNCHANGED
MUSCLE WEAKNESS: 1
DIARRHEA: 1
APPETITE LEVEL: GOOD
STOOL FREQUENCY: DAILY
DENIES PAIN: 1

## 2023-11-22 ENCOUNTER — HOME CARE VISIT (OUTPATIENT)
Dept: HOME HEALTH SERVICES | Facility: HOME HEALTH | Age: 83
End: 2023-11-22
Payer: MEDICARE

## 2023-11-22 PROCEDURE — 1090000002 HH PPS REVENUE DEBIT

## 2023-11-22 PROCEDURE — 1090000001 HH PPS REVENUE CREDIT

## 2023-11-22 PROCEDURE — G0156 HHCP-SVS OF AIDE,EA 15 MIN: HCPCS | Mod: HHH

## 2023-11-22 SDOH — ECONOMIC STABILITY: HOUSING INSECURITY: EVIDENCE OF SMOKING MATERIAL: 0

## 2023-11-22 SDOH — HEALTH STABILITY: MENTAL HEALTH: SMOKING IN HOME: 0

## 2023-11-22 SDOH — ECONOMIC STABILITY: HOUSING INSECURITY: HOME SAFETY: HOSPITAL BED IN LIVING ROOM, FAMILY PRESENT 24 HOURS PER DAY

## 2023-11-22 ASSESSMENT — ACTIVITIES OF DAILY LIVING (ADL)
BATHING_CURRENT_FUNCTION: MAXIMUM ASSIST
LAUNDRY: DEPENDENT
PREPARING MEALS: DEPENDENT
LAUNDRY ASSESSED: 1
TOILETING: 1
GROOMING ASSESSED: 1
HOUSEKEEPING ASSESSED: 1
DRESSING_UB_CURRENT_FUNCTION: MAXIMUM ASSIST
FEEDING ASSESSED: 1
GROOMING EQUIPMENT USED: SETUP
BATHING ASSESSED: 1
LIGHT HOUSEKEEPING: DEPENDENT

## 2023-11-22 ASSESSMENT — ENCOUNTER SYMPTOMS
PAIN LOCATION - EXACERBATING FACTORS: RAIN
PAIN LOCATION - PAIN FREQUENCY: CONSTANT
PERSON REPORTING PAIN: PATIENT
PAIN SEVERITY GOAL: 0/10
LOWEST PAIN SEVERITY IN PAST 24 HOURS: 5/10
PAIN LOCATION - PAIN DURATION: COMES AND GOES
PAIN LOCATION - PAIN FREQUENCY: CONSTANT
PAIN LOCATION - PAIN QUALITY: ACHES
PAIN LOCATION - PAIN DURATION: COMES AND GOES
PAIN: 1
PAIN LOCATION - EXACERBATING FACTORS: RAIN
PAIN LOCATION: LEFT KNEE
SUBJECTIVE PAIN PROGRESSION: UNCHANGED
PAIN LOCATION - PAIN QUALITY: ACHES
HIGHEST PAIN SEVERITY IN PAST 24 HOURS: 7/10
PAIN LOCATION - PAIN SEVERITY: 7/10
PAIN LOCATION - PAIN SEVERITY: 7/10
PAIN LOCATION: RIGHT KNEE

## 2023-11-23 PROCEDURE — 1090000001 HH PPS REVENUE CREDIT

## 2023-11-23 PROCEDURE — 1090000002 HH PPS REVENUE DEBIT

## 2023-11-24 ENCOUNTER — HOME CARE VISIT (OUTPATIENT)
Dept: HOME HEALTH SERVICES | Facility: HOME HEALTH | Age: 83
End: 2023-11-24
Payer: MEDICARE

## 2023-11-24 PROCEDURE — G0157 HHC PT ASSISTANT EA 15: HCPCS | Mod: HHH

## 2023-11-24 PROCEDURE — G0156 HHCP-SVS OF AIDE,EA 15 MIN: HCPCS | Mod: HHH

## 2023-11-24 PROCEDURE — 1090000001 HH PPS REVENUE CREDIT

## 2023-11-24 PROCEDURE — G0158 HHC OT ASSISTANT EA 15: HCPCS | Mod: HHH

## 2023-11-24 PROCEDURE — 1090000002 HH PPS REVENUE DEBIT

## 2023-11-24 SDOH — HEALTH STABILITY: MENTAL HEALTH: SMOKING IN HOME: 0

## 2023-11-24 SDOH — ECONOMIC STABILITY: HOUSING INSECURITY: EVIDENCE OF SMOKING MATERIAL: 0

## 2023-11-24 ASSESSMENT — PAIN SCALES - PAIN ASSESSMENT IN ADVANCED DEMENTIA (PAINAD)
BREATHING: 1
CONSOLABILITY: 0
NEGVOCALIZATION: 0
CONSOLABILITY: 0 - NO NEED TO CONSOLE.
FACIALEXPRESSION: 0 - SMILING OR INEXPRESSIVE.
TOTALSCORE: 1
BODYLANGUAGE: 0
NEGVOCALIZATION: 0 - NONE.
BODYLANGUAGE: 0 - RELAXED.
FACIALEXPRESSION: 0

## 2023-11-24 ASSESSMENT — ENCOUNTER SYMPTOMS
PAIN LOCATION: RIGHT HIP
PAIN LOCATION: LEFT KNEE
PAIN SEVERITY GOAL: 1/10
PAIN LOCATION: RIGHT KNEE
PAIN LOCATION: RIGHT SHOULDER
PERSON REPORTING PAIN: PATIENT
PAIN: 1
SUBJECTIVE PAIN PROGRESSION: UNCHANGED
ANGER WITHIN DEFINED LIMITS: 1
HIGHEST PAIN SEVERITY IN PAST 24 HOURS: 4/10
LOWEST PAIN SEVERITY IN PAST 24 HOURS: 4/10
AGGRESSION WITHIN DEFINED LIMITS: 1

## 2023-11-25 PROCEDURE — 1090000001 HH PPS REVENUE CREDIT

## 2023-11-25 PROCEDURE — 1090000002 HH PPS REVENUE DEBIT

## 2023-11-25 SDOH — HEALTH STABILITY: PHYSICAL HEALTH: RESISTANCE: ISO LEG PRESS, AROM OTHERWISE

## 2023-11-25 SDOH — HEALTH STABILITY: PHYSICAL HEALTH: EXERCISE ACTIVITIES SETS: 1

## 2023-11-25 SDOH — HEALTH STABILITY: PHYSICAL HEALTH: PHYSICAL EXERCISE: SEATED

## 2023-11-25 SDOH — HEALTH STABILITY: PHYSICAL HEALTH: EXERCISE ACTIVITY: MARCHING, LAQ, LEG PRESS

## 2023-11-25 SDOH — HEALTH STABILITY: PHYSICAL HEALTH: EXERCISE TYPE: SEATED LE STRENGTHENING/ROM

## 2023-11-25 SDOH — HEALTH STABILITY: PHYSICAL HEALTH: PHYSICAL EXERCISE: 10

## 2023-11-25 ASSESSMENT — ENCOUNTER SYMPTOMS
PAIN LOCATION: RIGHT HIP
PERSON REPORTING PAIN: PATIENT
PAIN LOCATION: RIGHT KNEE
PAIN LOCATION: LEFT KNEE
SUBJECTIVE PAIN PROGRESSION: UNCHANGED
PAIN LOCATION: RIGHT SHOULDER
PAIN: 1
LOWEST PAIN SEVERITY IN PAST 24 HOURS: 1/10
HIGHEST PAIN SEVERITY IN PAST 24 HOURS: 4/10

## 2023-11-26 PROCEDURE — 1090000001 HH PPS REVENUE CREDIT

## 2023-11-26 PROCEDURE — 1090000002 HH PPS REVENUE DEBIT

## 2023-11-27 ENCOUNTER — HOME CARE VISIT (OUTPATIENT)
Dept: HOME HEALTH SERVICES | Facility: HOME HEALTH | Age: 83
End: 2023-11-27
Payer: MEDICARE

## 2023-11-27 PROCEDURE — 1090000001 HH PPS REVENUE CREDIT

## 2023-11-27 PROCEDURE — G0156 HHCP-SVS OF AIDE,EA 15 MIN: HCPCS | Mod: HHH

## 2023-11-27 PROCEDURE — 1090000002 HH PPS REVENUE DEBIT

## 2023-11-28 ENCOUNTER — HOME CARE VISIT (OUTPATIENT)
Dept: HOME HEALTH SERVICES | Facility: HOME HEALTH | Age: 83
End: 2023-11-28
Payer: MEDICARE

## 2023-11-28 PROCEDURE — G0158 HHC OT ASSISTANT EA 15: HCPCS | Mod: HHH

## 2023-11-28 PROCEDURE — 1090000002 HH PPS REVENUE DEBIT

## 2023-11-28 PROCEDURE — 1090000001 HH PPS REVENUE CREDIT

## 2023-11-28 SDOH — HEALTH STABILITY: MENTAL HEALTH: SMOKING IN HOME: 0

## 2023-11-28 SDOH — ECONOMIC STABILITY: HOUSING INSECURITY: EVIDENCE OF SMOKING MATERIAL: 0

## 2023-11-28 ASSESSMENT — ENCOUNTER SYMPTOMS
AGGRESSION WITHIN DEFINED LIMITS: 1
SUBJECTIVE PAIN PROGRESSION: WAXING AND WANING
PAIN LOCATION: LEFT KNEE
PAIN LOCATION: RIGHT KNEE
ANGER WITHIN DEFINED LIMITS: 1
PERSON REPORTING PAIN: PATIENT
PAIN SEVERITY GOAL: 1/10
HIGHEST PAIN SEVERITY IN PAST 24 HOURS: 8/10
LOWEST PAIN SEVERITY IN PAST 24 HOURS: 4/10
PAIN: 1

## 2023-11-28 ASSESSMENT — PAIN SCALES - PAIN ASSESSMENT IN ADVANCED DEMENTIA (PAINAD)
FACIALEXPRESSION: 0 - SMILING OR INEXPRESSIVE.
TOTALSCORE: 1
NEGVOCALIZATION: 0
CONSOLABILITY: 0
BODYLANGUAGE: 0
BODYLANGUAGE: 0 - RELAXED.
CONSOLABILITY: 0 - NO NEED TO CONSOLE.
NEGVOCALIZATION: 0 - NONE.
BREATHING: 1
FACIALEXPRESSION: 0

## 2023-11-29 ENCOUNTER — HOME CARE VISIT (OUTPATIENT)
Dept: HOME HEALTH SERVICES | Facility: HOME HEALTH | Age: 83
End: 2023-11-29
Payer: MEDICARE

## 2023-11-29 VITALS
DIASTOLIC BLOOD PRESSURE: 50 MMHG | TEMPERATURE: 97.8 F | OXYGEN SATURATION: 98 % | HEART RATE: 60 BPM | SYSTOLIC BLOOD PRESSURE: 110 MMHG | RESPIRATION RATE: 18 BRPM

## 2023-11-29 PROCEDURE — G0157 HHC PT ASSISTANT EA 15: HCPCS | Mod: HHH

## 2023-11-29 PROCEDURE — 1090000001 HH PPS REVENUE CREDIT

## 2023-11-29 PROCEDURE — G0300 HHS/HOSPICE OF LPN EA 15 MIN: HCPCS | Mod: HHH

## 2023-11-29 PROCEDURE — 1090000002 HH PPS REVENUE DEBIT

## 2023-11-29 SDOH — ECONOMIC STABILITY: HOUSING INSECURITY: EVIDENCE OF SMOKING MATERIAL: 0

## 2023-11-29 SDOH — HEALTH STABILITY: MENTAL HEALTH: SMOKING IN HOME: 0

## 2023-11-29 ASSESSMENT — ENCOUNTER SYMPTOMS
LOWEST PAIN SEVERITY IN PAST 24 HOURS: 4/10
DEPRESSION: 0
PAIN LOCATION: GENERALIZED
PAIN LOCATION - PAIN FREQUENCY: CONSTANT
CHANGE IN APPETITE: UNCHANGED
SUBJECTIVE PAIN PROGRESSION: UNCHANGED
PAIN LOCATION - PAIN QUALITY: ACHE
PERSON REPORTING PAIN: PATIENT
LAST BOWEL MOVEMENT: 66807
OCCASIONAL FEELINGS OF UNSTEADINESS: 1
BOWEL PATTERN NORMAL: 1
APPETITE LEVEL: FAIR
PAIN SEVERITY GOAL: 0/10
LOSS OF SENSATION IN FEET: 0
BLURRED VISION: 1
PAIN LOCATION - RELIEVING FACTORS: MEDICATION, ICE
PAIN: 1
PAIN LOCATION - PAIN SEVERITY: 6/10
HIGHEST PAIN SEVERITY IN PAST 24 HOURS: 7/10

## 2023-11-29 ASSESSMENT — PAIN SCALES - PAIN ASSESSMENT IN ADVANCED DEMENTIA (PAINAD)
BODYLANGUAGE: 0
NEGVOCALIZATION: 0
CONSOLABILITY: 0
NEGVOCALIZATION: 0 - NONE.
BODYLANGUAGE: 0 - RELAXED.
TOTALSCORE: 0
BREATHING: 0
CONSOLABILITY: 0 - NO NEED TO CONSOLE.
FACIALEXPRESSION: 0
FACIALEXPRESSION: 0 - SMILING OR INEXPRESSIVE.

## 2023-11-29 ASSESSMENT — ACTIVITIES OF DAILY LIVING (ADL): MONEY MANAGEMENT (EXPENSES/BILLS): NEEDS ASSISTANCE

## 2023-11-29 NOTE — HOME HEALTH
SN visit completed. VSS. C/O 6/10 pain to knees partially relieved with medication and ice. No concerns.

## 2023-11-30 ENCOUNTER — HOME CARE VISIT (OUTPATIENT)
Dept: HOME HEALTH SERVICES | Facility: HOME HEALTH | Age: 83
End: 2023-11-30
Payer: MEDICARE

## 2023-11-30 PROCEDURE — G0158 HHC OT ASSISTANT EA 15: HCPCS | Mod: HHH

## 2023-11-30 PROCEDURE — 1090000001 HH PPS REVENUE CREDIT

## 2023-11-30 PROCEDURE — 1090000002 HH PPS REVENUE DEBIT

## 2023-11-30 PROCEDURE — G0156 HHCP-SVS OF AIDE,EA 15 MIN: HCPCS | Mod: HHH

## 2023-11-30 SDOH — HEALTH STABILITY: MENTAL HEALTH: SMOKING IN HOME: 0

## 2023-11-30 SDOH — HEALTH STABILITY: PHYSICAL HEALTH: EXERCISE TYPE: SUPINE LE THER EX

## 2023-11-30 SDOH — HEALTH STABILITY: PHYSICAL HEALTH: PHYSICAL EXERCISE: 10

## 2023-11-30 SDOH — HEALTH STABILITY: PHYSICAL HEALTH: EXERCISE ACTIVITIES SETS: 1

## 2023-11-30 SDOH — HEALTH STABILITY: PHYSICAL HEALTH: PHYSICAL EXERCISE: SUPINE

## 2023-11-30 SDOH — ECONOMIC STABILITY: HOUSING INSECURITY: EVIDENCE OF SMOKING MATERIAL: 0

## 2023-11-30 SDOH — HEALTH STABILITY: PHYSICAL HEALTH: EXERCISE ACTIVITY: HIP ABD, ISO LEG PRESS, QS, GS

## 2023-11-30 ASSESSMENT — ENCOUNTER SYMPTOMS
SUBJECTIVE PAIN PROGRESSION: UNCHANGED
PAIN LOCATION - PAIN QUALITY: LOWER
MUSCLE WEAKNESS: 1
PAIN LOCATION - RELIEVING FACTORS: ICE
PAIN LOCATION: RIGHT LEG
PAIN LOCATION: RIGHT LEG
PAIN LOCATION - RELIEVING FACTORS: ICE
PAIN LOCATION - PAIN SEVERITY: 9/10
PAIN LOCATION - PAIN SEVERITY: 9/10
SUBJECTIVE PAIN PROGRESSION: WAXING AND WANING
PAIN LOCATION - PAIN QUALITY: LOWER
PAIN LOCATION - PAIN SEVERITY: 9/10
LOWEST PAIN SEVERITY IN PAST 24 HOURS: 7/10
ANGER WITHIN DEFINED LIMITS: 1
HIGHEST PAIN SEVERITY IN PAST 24 HOURS: 10/10
PAIN LOCATION - RELIEVING FACTORS: ICE
PAIN LOCATION: LEFT LEG
AGGRESSION WITHIN DEFINED LIMITS: 1
PAIN LOCATION - RELIEVING FACTORS: ICE
PAIN LOCATION - EXACERBATING FACTORS: STANDING
PAIN LOCATION: RIGHT KNEE
PAIN: 1
PAIN LOCATION - PAIN SEVERITY: 5/10
PAIN LOCATION - PAIN SEVERITY: 9/10
PAIN LOCATION - PAIN SEVERITY: 5/10
PAIN SEVERITY GOAL: 1/10
PAIN: 1
LIMITED RANGE OF MOTION: 1
PERSON REPORTING PAIN: PATIENT
PAIN LOCATION: LEFT LEG
PAIN LOCATION - EXACERBATING FACTORS: STANDING
PAIN LOCATION: LEFT KNEE

## 2023-11-30 ASSESSMENT — PAIN SCALES - PAIN ASSESSMENT IN ADVANCED DEMENTIA (PAINAD)
BREATHING: 1
NEGVOCALIZATION: 0
TOTALSCORE: 1
CONSOLABILITY: 0 - NO NEED TO CONSOLE.
NEGVOCALIZATION: 0 - NONE.
BODYLANGUAGE: 0
CONSOLABILITY: 0
FACIALEXPRESSION: 0 - SMILING OR INEXPRESSIVE.
BODYLANGUAGE: 0 - RELAXED.
FACIALEXPRESSION: 0

## 2023-11-30 ASSESSMENT — ACTIVITIES OF DAILY LIVING (ADL)
BATHING ASSESSED: 1
PHYSICAL_TRANSFERS_DEVICES: LIFT CHAIR
BATHING_CURRENT_FUNCTION: MAXIMUM ASSIST
PHYSICAL TRANSFERS ASSESSED: 1
DRESSING_UB_CURRENT_FUNCTION: MAXIMUM ASSIST
CURRENT_FUNCTION: TWO PERSON

## 2023-12-01 ENCOUNTER — HOME CARE VISIT (OUTPATIENT)
Dept: HOME HEALTH SERVICES | Facility: HOME HEALTH | Age: 83
End: 2023-12-01
Payer: MEDICARE

## 2023-12-01 VITALS
RESPIRATION RATE: 18 BRPM | DIASTOLIC BLOOD PRESSURE: 70 MMHG | HEART RATE: 60 BPM | SYSTOLIC BLOOD PRESSURE: 118 MMHG | TEMPERATURE: 97.2 F | OXYGEN SATURATION: 98 %

## 2023-12-01 PROCEDURE — 1090000002 HH PPS REVENUE DEBIT

## 2023-12-01 PROCEDURE — 1090000001 HH PPS REVENUE CREDIT

## 2023-12-01 PROCEDURE — G0300 HHS/HOSPICE OF LPN EA 15 MIN: HCPCS | Mod: HHH

## 2023-12-01 PROCEDURE — G0157 HHC PT ASSISTANT EA 15: HCPCS | Mod: HHH

## 2023-12-01 NOTE — HOME HEALTH
PRN SN visit completed. VSS. Denies pain. Blister to RLE opened per CG. No drainage or s/s infection noted at this time. Cleansed with saline and covered with dsd. No further concerns.

## 2023-12-02 PROCEDURE — 1090000002 HH PPS REVENUE DEBIT

## 2023-12-02 PROCEDURE — 1090000001 HH PPS REVENUE CREDIT

## 2023-12-02 ASSESSMENT — ACTIVITIES OF DAILY LIVING (ADL)
PHYSICAL TRANSFERS ASSESSED: 1
MONEY MANAGEMENT (EXPENSES/BILLS): NEEDS ASSISTANCE
CURRENT_FUNCTION: TWO PERSON

## 2023-12-02 ASSESSMENT — PAIN SCALES - PAIN ASSESSMENT IN ADVANCED DEMENTIA (PAINAD)
NEGVOCALIZATION: 0
BODYLANGUAGE: 0 - RELAXED.
CONSOLABILITY: 0 - NO NEED TO CONSOLE.
TOTALSCORE: 0
FACIALEXPRESSION: 0 - SMILING OR INEXPRESSIVE.
BREATHING: 0
NEGVOCALIZATION: 0 - NONE.
FACIALEXPRESSION: 0
CONSOLABILITY: 0
BODYLANGUAGE: 0

## 2023-12-02 ASSESSMENT — ENCOUNTER SYMPTOMS
PAIN LOCATION - PAIN SEVERITY: 6/10
LOWEST PAIN SEVERITY IN PAST 24 HOURS: 0/10
APPETITE LEVEL: GOOD
BOWEL PATTERN NORMAL: 1
PAIN LOCATION: RIGHT KNEE
LIMITED RANGE OF MOTION: 1
LOSS OF SENSATION IN FEET: 1
PERSON REPORTING PAIN: PATIENT
PAIN LOCATION: LEFT KNEE
HIGHEST PAIN SEVERITY IN PAST 24 HOURS: 0/10
DEPRESSION: 1
BLURRED VISION: 1
OCCASIONAL FEELINGS OF UNSTEADINESS: 0
PAIN LOCATION - PAIN SEVERITY: 6/10
DENIES PAIN: 1
PAIN SEVERITY GOAL: 0/10
PERSON REPORTING PAIN: PATIENT
MUSCLE WEAKNESS: 1
CHANGE IN APPETITE: UNCHANGED
PAIN: 1
LAST BOWEL MOVEMENT: 66809
SUBJECTIVE PAIN PROGRESSION: WAXING AND WANING
SUBJECTIVE PAIN PROGRESSION: UNCHANGED

## 2023-12-03 PROCEDURE — 1090000001 HH PPS REVENUE CREDIT

## 2023-12-03 PROCEDURE — 1090000002 HH PPS REVENUE DEBIT

## 2023-12-04 ENCOUNTER — HOME CARE VISIT (OUTPATIENT)
Dept: HOME HEALTH SERVICES | Facility: HOME HEALTH | Age: 83
End: 2023-12-04
Payer: MEDICARE

## 2023-12-04 PROCEDURE — G0156 HHCP-SVS OF AIDE,EA 15 MIN: HCPCS | Mod: HHH

## 2023-12-04 PROCEDURE — 1090000001 HH PPS REVENUE CREDIT

## 2023-12-04 PROCEDURE — 1090000002 HH PPS REVENUE DEBIT

## 2023-12-04 PROCEDURE — G0157 HHC PT ASSISTANT EA 15: HCPCS | Mod: HHH

## 2023-12-05 ENCOUNTER — HOME CARE VISIT (OUTPATIENT)
Dept: HOME HEALTH SERVICES | Facility: HOME HEALTH | Age: 83
End: 2023-12-05
Payer: MEDICARE

## 2023-12-05 PROCEDURE — G0299 HHS/HOSPICE OF RN EA 15 MIN: HCPCS | Mod: HHH

## 2023-12-05 PROCEDURE — 1090000001 HH PPS REVENUE CREDIT

## 2023-12-05 PROCEDURE — 1090000002 HH PPS REVENUE DEBIT

## 2023-12-05 PROCEDURE — G0158 HHC OT ASSISTANT EA 15: HCPCS | Mod: HHH

## 2023-12-05 SDOH — HEALTH STABILITY: MENTAL HEALTH: SMOKING IN HOME: 0

## 2023-12-05 SDOH — ECONOMIC STABILITY: HOUSING INSECURITY: EVIDENCE OF SMOKING MATERIAL: 0

## 2023-12-05 ASSESSMENT — PAIN SCALES - PAIN ASSESSMENT IN ADVANCED DEMENTIA (PAINAD)
BODYLANGUAGE: 0 - RELAXED.
CONSOLABILITY: 0 - NO NEED TO CONSOLE.
FACIALEXPRESSION: 0 - SMILING OR INEXPRESSIVE.
BREATHING: 0
NEGVOCALIZATION: 0
TOTALSCORE: 0
BODYLANGUAGE: 0
FACIALEXPRESSION: 0
CONSOLABILITY: 0
NEGVOCALIZATION: 0 - NONE.

## 2023-12-05 ASSESSMENT — ENCOUNTER SYMPTOMS
PAIN: 1
HIGHEST PAIN SEVERITY IN PAST 24 HOURS: 9/10
AGGRESSION WITHIN DEFINED LIMITS: 1
PERSON REPORTING PAIN: PATIENT
LOWEST PAIN SEVERITY IN PAST 24 HOURS: 4/10
PAIN SEVERITY GOAL: 1/10
HIGHEST PAIN SEVERITY IN PAST 24 HOURS: 6/10
PERSON REPORTING PAIN: PATIENT
LOWEST PAIN SEVERITY IN PAST 24 HOURS: 6/10
PAIN: 1
SUBJECTIVE PAIN PROGRESSION: GRADUALLY IMPROVING
ANGER WITHIN DEFINED LIMITS: 1
PAIN LOCATION: LEFT LEG

## 2023-12-06 ENCOUNTER — HOME CARE VISIT (OUTPATIENT)
Dept: HOME HEALTH SERVICES | Facility: HOME HEALTH | Age: 83
End: 2023-12-06
Payer: MEDICARE

## 2023-12-06 VITALS
OXYGEN SATURATION: 99 % | RESPIRATION RATE: 16 BRPM | DIASTOLIC BLOOD PRESSURE: 60 MMHG | HEART RATE: 68 BPM | TEMPERATURE: 98.1 F | SYSTOLIC BLOOD PRESSURE: 120 MMHG

## 2023-12-06 PROCEDURE — 1090000001 HH PPS REVENUE CREDIT

## 2023-12-06 PROCEDURE — 1090000002 HH PPS REVENUE DEBIT

## 2023-12-06 PROCEDURE — G0157 HHC PT ASSISTANT EA 15: HCPCS | Mod: HHH

## 2023-12-06 ASSESSMENT — ENCOUNTER SYMPTOMS
DENIES PAIN: 1
APPETITE LEVEL: GOOD

## 2023-12-07 ENCOUNTER — HOME CARE VISIT (OUTPATIENT)
Dept: HOME HEALTH SERVICES | Facility: HOME HEALTH | Age: 83
End: 2023-12-07
Payer: MEDICARE

## 2023-12-07 PROCEDURE — 1090000001 HH PPS REVENUE CREDIT

## 2023-12-07 PROCEDURE — 1090000002 HH PPS REVENUE DEBIT

## 2023-12-07 PROCEDURE — G0156 HHCP-SVS OF AIDE,EA 15 MIN: HCPCS | Mod: HHH

## 2023-12-07 SDOH — HEALTH STABILITY: MENTAL HEALTH: SMOKING IN HOME: 0

## 2023-12-07 SDOH — ECONOMIC STABILITY: HOUSING INSECURITY: EVIDENCE OF SMOKING MATERIAL: 0

## 2023-12-07 ASSESSMENT — ACTIVITIES OF DAILY LIVING (ADL)
CURRENT_FUNCTION: TWO PERSON
PHYSICAL_TRANSFERS_DEVICES: HOYER
CURRENT_FUNCTION: MAXIMUM ASSIST
PHYSICAL TRANSFERS ASSESSED: 1

## 2023-12-07 ASSESSMENT — ENCOUNTER SYMPTOMS
MUSCLE WEAKNESS: 1
SUBJECTIVE PAIN PROGRESSION: WAXING AND WANING
PAIN: 1
PAIN LOCATION: RIGHT KNEE
PAIN LOCATION: LEFT KNEE
PERSON REPORTING PAIN: PATIENT
PAIN LOCATION - PAIN SEVERITY: 6/10
LIMITED RANGE OF MOTION: 1
PAIN LOCATION - PAIN SEVERITY: 6/10

## 2023-12-08 ENCOUNTER — HOME CARE VISIT (OUTPATIENT)
Dept: HOME HEALTH SERVICES | Facility: HOME HEALTH | Age: 83
End: 2023-12-08
Payer: MEDICARE

## 2023-12-08 PROCEDURE — 1090000002 HH PPS REVENUE DEBIT

## 2023-12-08 PROCEDURE — G0158 HHC OT ASSISTANT EA 15: HCPCS | Mod: HHH

## 2023-12-08 PROCEDURE — 1090000001 HH PPS REVENUE CREDIT

## 2023-12-08 SDOH — HEALTH STABILITY: MENTAL HEALTH: SMOKING IN HOME: 0

## 2023-12-08 SDOH — ECONOMIC STABILITY: HOUSING INSECURITY: EVIDENCE OF SMOKING MATERIAL: 0

## 2023-12-08 ASSESSMENT — PAIN SCALES - PAIN ASSESSMENT IN ADVANCED DEMENTIA (PAINAD)
NEGVOCALIZATION: 0
NEGVOCALIZATION: 0 - NONE.
FACIALEXPRESSION: 0
CONSOLABILITY: 0
BREATHING: 1
BODYLANGUAGE: 0 - RELAXED.
TOTALSCORE: 1
FACIALEXPRESSION: 0 - SMILING OR INEXPRESSIVE.
BODYLANGUAGE: 0
CONSOLABILITY: 0 - NO NEED TO CONSOLE.

## 2023-12-08 ASSESSMENT — ENCOUNTER SYMPTOMS
PAIN: 1
HIGHEST PAIN SEVERITY IN PAST 24 HOURS: 8/10
FORGETFULNESS: 1
PAIN LOCATION - EXACERBATING FACTORS: ACTIVITY
PAIN LOCATION: LEFT KNEE
PAIN LOCATION: RIGHT KNEE
LOWEST PAIN SEVERITY IN PAST 24 HOURS: 3/10
PERSON REPORTING PAIN: PATIENT
AGGRESSION WITHIN DEFINED LIMITS: 1
ANGER WITHIN DEFINED LIMITS: 1
PAIN SEVERITY GOAL: 1/10
SUBJECTIVE PAIN PROGRESSION: WAXING AND WANING
PAIN LOCATION - EXACERBATING FACTORS: ACTIVITY

## 2023-12-09 PROCEDURE — 1090000001 HH PPS REVENUE CREDIT

## 2023-12-09 PROCEDURE — 1090000002 HH PPS REVENUE DEBIT

## 2023-12-10 PROCEDURE — 1090000001 HH PPS REVENUE CREDIT

## 2023-12-10 PROCEDURE — 1090000002 HH PPS REVENUE DEBIT

## 2023-12-11 ENCOUNTER — HOME CARE VISIT (OUTPATIENT)
Dept: HOME HEALTH SERVICES | Facility: HOME HEALTH | Age: 83
End: 2023-12-11
Payer: MEDICARE

## 2023-12-11 PROCEDURE — G0156 HHCP-SVS OF AIDE,EA 15 MIN: HCPCS | Mod: HHH

## 2023-12-11 PROCEDURE — 1090000002 HH PPS REVENUE DEBIT

## 2023-12-11 PROCEDURE — 1090000001 HH PPS REVENUE CREDIT

## 2023-12-12 ENCOUNTER — HOME CARE VISIT (OUTPATIENT)
Dept: HOME HEALTH SERVICES | Facility: HOME HEALTH | Age: 83
End: 2023-12-12
Payer: MEDICARE

## 2023-12-12 PROCEDURE — G0158 HHC OT ASSISTANT EA 15: HCPCS | Mod: HHH

## 2023-12-12 PROCEDURE — 1090000002 HH PPS REVENUE DEBIT

## 2023-12-12 PROCEDURE — 1090000001 HH PPS REVENUE CREDIT

## 2023-12-12 PROCEDURE — G0151 HHCP-SERV OF PT,EA 15 MIN: HCPCS | Mod: HHH

## 2023-12-12 SDOH — ECONOMIC STABILITY: HOUSING INSECURITY: EVIDENCE OF SMOKING MATERIAL: 0

## 2023-12-12 SDOH — HEALTH STABILITY: MENTAL HEALTH: SMOKING IN HOME: 0

## 2023-12-12 ASSESSMENT — ENCOUNTER SYMPTOMS
PAIN: 1
SUBJECTIVE PAIN PROGRESSION: UNCHANGED
PAIN LOCATION: RIGHT KNEE
HIGHEST PAIN SEVERITY IN PAST 24 HOURS: 7/10
LOWEST PAIN SEVERITY IN PAST 24 HOURS: 4/10
AGGRESSION WITHIN DEFINED LIMITS: 1
PAIN LOCATION - EXACERBATING FACTORS: WBING
SUBJECTIVE PAIN PROGRESSION: WAXING AND WANING
PAIN LOCATION: LEFT KNEE
FORGETFULNESS: 1
PAIN LOCATION: RIGHT KNEE
PAIN LOCATION: LEFT KNEE
PERSON REPORTING PAIN: PATIENT
PERSON REPORTING PAIN: PATIENT
ANGER WITHIN DEFINED LIMITS: 1
PAIN SEVERITY GOAL: 1/10
PAIN LOCATION - EXACERBATING FACTORS: WBING
PAIN: 1

## 2023-12-12 ASSESSMENT — PAIN SCALES - PAIN ASSESSMENT IN ADVANCED DEMENTIA (PAINAD)
BREATHING: 0
TOTALSCORE: 0
BODYLANGUAGE: 0
CONSOLABILITY: 0 - NO NEED TO CONSOLE.
BODYLANGUAGE: 0 - RELAXED.
CONSOLABILITY: 0
NEGVOCALIZATION: 0
FACIALEXPRESSION: 0 - SMILING OR INEXPRESSIVE.
NEGVOCALIZATION: 0 - NONE.
FACIALEXPRESSION: 0

## 2023-12-13 ENCOUNTER — HOME CARE VISIT (OUTPATIENT)
Dept: HOME HEALTH SERVICES | Facility: HOME HEALTH | Age: 83
End: 2023-12-13
Payer: MEDICARE

## 2023-12-13 VITALS — TEMPERATURE: 97.8 F

## 2023-12-13 PROCEDURE — G0152 HHCP-SERV OF OT,EA 15 MIN: HCPCS | Mod: HHH

## 2023-12-13 PROCEDURE — G0299 HHS/HOSPICE OF RN EA 15 MIN: HCPCS | Mod: HHH

## 2023-12-13 PROCEDURE — 1090000001 HH PPS REVENUE CREDIT

## 2023-12-13 PROCEDURE — 1090000002 HH PPS REVENUE DEBIT

## 2023-12-13 ASSESSMENT — ENCOUNTER SYMPTOMS
PAIN LOCATION - PAIN FREQUENCY: CONSTANT
PAIN LOCATION - PAIN FREQUENCY: CONSTANT
PAIN LOCATION - PAIN SEVERITY: 5/10
PAIN LOCATION - PAIN DURATION: CONSTANT
PAIN LOCATION - PAIN FREQUENCY: CONSTANT
SUBJECTIVE PAIN PROGRESSION: UNCHANGED
HIGHEST PAIN SEVERITY IN PAST 24 HOURS: 7/10
PAIN LOCATION - PAIN DURATION: CONSTANT
PAIN LOCATION - PAIN SEVERITY: 5/10
PAIN LOCATION: RIGHT SHOULDER
PERSON REPORTING PAIN: PATIENT
PAIN LOCATION: LEFT KNEE
PAIN LOCATION - PAIN DURATION: CONSTANT
PAIN LOCATION - PAIN SEVERITY: 5/10
PAIN: 1
PAIN LOCATION - PAIN QUALITY: ACHES
PAIN LOCATION - PAIN QUALITY: ACHES
LOWEST PAIN SEVERITY IN PAST 24 HOURS: 5/10
PAIN SEVERITY GOAL: 0/10
PAIN LOCATION - PAIN QUALITY: ACHES
PAIN LOCATION: RIGHT LEG

## 2023-12-13 ASSESSMENT — ACTIVITIES OF DAILY LIVING (ADL)
LAUNDRY ASSESSED: 1
GROOMING ASSESSED: 1
TOILETING: 1
HOUSEKEEPING ASSESSED: 1
BATHING ASSESSED: 1
AMBULATION ASSISTANCE: 1

## 2023-12-14 PROCEDURE — 1090000001 HH PPS REVENUE CREDIT

## 2023-12-14 PROCEDURE — 1090000002 HH PPS REVENUE DEBIT

## 2023-12-14 SDOH — ECONOMIC STABILITY: HOUSING INSECURITY: HOME SAFETY: PATIENT STAYING IN HOSPITAL BED IN DINING ROOM

## 2023-12-14 SDOH — ECONOMIC STABILITY: HOUSING INSECURITY: EVIDENCE OF SMOKING MATERIAL: 0

## 2023-12-14 SDOH — HEALTH STABILITY: MENTAL HEALTH: SMOKING IN HOME: 0

## 2023-12-14 ASSESSMENT — ACTIVITIES OF DAILY LIVING (ADL)
LIGHT HOUSEKEEPING: DEPENDENT
DRESSING_UB_CURRENT_FUNCTION: MODERATE ASSIST
BATHING_CURRENT_FUNCTION: MAXIMUM ASSIST
DRESSING_LB_CURRENT_FUNCTION: MAXIMUM ASSIST
GROOMING EQUIPMENT USED: SETUP
GROOMING_CURRENT_FUNCTION: SUPERVISION
LAUNDRY: DEPENDENT
PREPARING MEALS: DEPENDENT

## 2023-12-15 ENCOUNTER — LAB REQUISITION (OUTPATIENT)
Dept: LAB | Facility: HOSPITAL | Age: 83
End: 2023-12-15
Payer: MEDICARE

## 2023-12-15 ENCOUNTER — HOME CARE VISIT (OUTPATIENT)
Dept: HOME HEALTH SERVICES | Facility: HOME HEALTH | Age: 83
End: 2023-12-15
Payer: MEDICARE

## 2023-12-15 ENCOUNTER — LAB REQUISITION (OUTPATIENT)
Dept: LAB | Facility: HOSPITAL | Age: 83
End: 2023-12-15
Payer: COMMERCIAL

## 2023-12-15 VITALS
HEART RATE: 58 BPM | DIASTOLIC BLOOD PRESSURE: 74 MMHG | TEMPERATURE: 97.7 F | SYSTOLIC BLOOD PRESSURE: 132 MMHG | OXYGEN SATURATION: 98 %

## 2023-12-15 DIAGNOSIS — I13.0 HYPERTENSIVE HEART AND CHRONIC KIDNEY DISEASE WITH HEART FAILURE AND STAGE 1 THROUGH STAGE 4 CHRONIC KIDNEY DISEASE, OR UNSPECIFIED CHRONIC KIDNEY DISEASE (MULTI): ICD-10-CM

## 2023-12-15 LAB
APPEARANCE UR: ABNORMAL
BILIRUB UR STRIP.AUTO-MCNC: NEGATIVE MG/DL
COLOR UR: YELLOW
GLUCOSE UR STRIP.AUTO-MCNC: NEGATIVE MG/DL
KETONES UR STRIP.AUTO-MCNC: NEGATIVE MG/DL
LEUKOCYTE ESTERASE UR QL STRIP.AUTO: ABNORMAL
MUCOUS THREADS #/AREA URNS AUTO: ABNORMAL /LPF
NITRITE UR QL STRIP.AUTO: NEGATIVE
PH UR STRIP.AUTO: 6 [PH]
PROT UR STRIP.AUTO-MCNC: NEGATIVE MG/DL
RBC # UR STRIP.AUTO: ABNORMAL /UL
RBC #/AREA URNS AUTO: >20 /HPF
SP GR UR STRIP.AUTO: 1
SQUAMOUS #/AREA URNS AUTO: ABNORMAL /HPF
UROBILINOGEN UR STRIP.AUTO-MCNC: <2 MG/DL
WBC #/AREA URNS AUTO: >50 /HPF
WBC CLUMPS #/AREA URNS AUTO: ABNORMAL /HPF

## 2023-12-15 PROCEDURE — 81001 URINALYSIS AUTO W/SCOPE: CPT

## 2023-12-15 PROCEDURE — G0156 HHCP-SVS OF AIDE,EA 15 MIN: HCPCS | Mod: HHH

## 2023-12-15 PROCEDURE — 1090000003 HH PPS REVENUE ADJ

## 2023-12-15 PROCEDURE — 87086 URINE CULTURE/COLONY COUNT: CPT

## 2023-12-15 PROCEDURE — G0299 HHS/HOSPICE OF RN EA 15 MIN: HCPCS | Mod: HHH

## 2023-12-15 PROCEDURE — 1090000002 HH PPS REVENUE DEBIT

## 2023-12-15 PROCEDURE — 1090000001 HH PPS REVENUE CREDIT

## 2023-12-15 ASSESSMENT — ENCOUNTER SYMPTOMS
DENIES PAIN: 1
MUSCLE WEAKNESS: 1
STOOL FREQUENCY: DAILY
DIARRHEA: 1

## 2023-12-16 PROCEDURE — 1090000002 HH PPS REVENUE DEBIT

## 2023-12-16 PROCEDURE — 1090000001 HH PPS REVENUE CREDIT

## 2023-12-16 SDOH — ECONOMIC STABILITY: HOUSING INSECURITY: EVIDENCE OF SMOKING MATERIAL: 0

## 2023-12-16 SDOH — HEALTH STABILITY: MENTAL HEALTH: SMOKING IN HOME: 0

## 2023-12-16 ASSESSMENT — ACTIVITIES OF DAILY LIVING (ADL)
OASIS_M1830: 05
HOME_HEALTH_OASIS: 01

## 2023-12-16 ASSESSMENT — ENCOUNTER SYMPTOMS: DENIES PAIN: 1

## 2023-12-17 LAB — BACTERIA UR CULT: ABNORMAL

## 2023-12-17 PROCEDURE — 1090000001 HH PPS REVENUE CREDIT

## 2023-12-17 PROCEDURE — 1090000002 HH PPS REVENUE DEBIT

## 2023-12-21 ENCOUNTER — HOME CARE VISIT (OUTPATIENT)
Dept: HOME HEALTH SERVICES | Facility: HOME HEALTH | Age: 83
End: 2023-12-21